# Patient Record
Sex: FEMALE | Race: OTHER | HISPANIC OR LATINO | Employment: PART TIME | URBAN - METROPOLITAN AREA
[De-identification: names, ages, dates, MRNs, and addresses within clinical notes are randomized per-mention and may not be internally consistent; named-entity substitution may affect disease eponyms.]

---

## 2019-07-22 ENCOUNTER — OFFICE VISIT (OUTPATIENT)
Dept: FAMILY MEDICINE CLINIC | Facility: CLINIC | Age: 59
End: 2019-07-22

## 2019-07-22 VITALS
WEIGHT: 172.4 LBS | TEMPERATURE: 97.7 F | DIASTOLIC BLOOD PRESSURE: 72 MMHG | BODY MASS INDEX: 28.72 KG/M2 | HEART RATE: 80 BPM | HEIGHT: 65 IN | RESPIRATION RATE: 16 BRPM | SYSTOLIC BLOOD PRESSURE: 116 MMHG

## 2019-07-22 DIAGNOSIS — M15.9 PRIMARY OSTEOARTHRITIS INVOLVING MULTIPLE JOINTS: ICD-10-CM

## 2019-07-22 DIAGNOSIS — K29.00 OTHER ACUTE GASTRITIS WITHOUT HEMORRHAGE: Primary | ICD-10-CM

## 2019-07-22 DIAGNOSIS — Z00.00 HEALTH CARE MAINTENANCE: ICD-10-CM

## 2019-07-22 PROBLEM — K29.50 CHRONIC GASTRITIS WITHOUT BLEEDING: Status: ACTIVE | Noted: 2019-07-22

## 2019-07-22 PROCEDURE — 99213 OFFICE O/P EST LOW 20 MIN: CPT | Performed by: FAMILY MEDICINE

## 2019-07-22 RX ORDER — IBUPROFEN 200 MG
TABLET ORAL EVERY 6 HOURS PRN
COMMUNITY
End: 2019-11-05

## 2019-07-22 RX ORDER — RANITIDINE 150 MG/1
150 TABLET ORAL 2 TIMES DAILY
COMMUNITY
End: 2019-10-04 | Stop reason: SDUPTHER

## 2019-07-22 NOTE — ASSESSMENT & PLAN NOTE
31-year-old, long-term use of tobacco  Reports regular follow-up in home country [place]  Currently moved to the [place]  Reports to have had mammogram, colonoscopy, Pap and low-dose CT done  Patient encouraged to provide health records  Scheduled adult annual visit for October ( patient will be traveling to [place] next month will return at the end of September)  Will review medical records and schedule health maintenance screening test accordingly  Patient agreeable with the plan

## 2019-07-22 NOTE — ASSESSMENT & PLAN NOTE
Reports osteoarthritis of the both knees, cervical spine and lower back  Patient reports chronic use of NSAIDs for symptom relief  Also on a chronic use of omeprazole for gastritis    - patient advised to use Tylenol for symptom relief in the setting of gastritis  - encourage use of Zantac instead omeprazole due to possible side effect of osteoporosis  - encourage strengthening exercises

## 2019-07-22 NOTE — PROGRESS NOTES
Assessment/Plan:    Chronic gastritis without bleeding  62year-old here to establish care  Reports chronic gastritis on chronic use of omeprazole and Zantac  Denies hematemesis, melena, hematochezia  Reports occasional abdominal pain (epigastric)  Physical exam benign  Patient reports chronic use of NSAIDs namely Aleve and Motrin for osteoarthritis    - advised to avoid NSAIDs in the setting of gastritis  - encouraged to quit smoking  - encourage use of Zantac for symptom relief    Osteoarthritis of multiple joints  Reports osteoarthritis of the both knees, cervical spine and lower back  Patient reports chronic use of NSAIDs for symptom relief  Also on a chronic use of omeprazole for gastritis    - patient advised to use Tylenol for symptom relief in the setting of gastritis  - encourage use of Zantac instead omeprazole due to possible side effect of osteoporosis  - encourage strengthening exercises      Health care maintenance  62year-old, long-term use of tobacco  Reports regular follow-up in home country [facility]  Currently moved to the [place]  Reports to have had mammogram, colonoscopy, Pap and low-dose CT done  Patient encouraged to provide health records  Scheduled adult annual visit for October ( patient will be traveling to [place] next month will return at the end of September)  Will review medical records and schedule health maintenance screening test accordingly  Patient agreeable with the plan           Subjective:      Patient ID: Little Dawkins is a 62 y o  female  HPI:  Pt reports hx Gastritis, osteoarthritis, Chronic low back pain on Chronic use of Alleve, Omeprazole and Zantac  Recently came to the country from [facility], where she reports to have had all her medical care  Reports right ear pain 2x within the past year  No discharge, occasional loss of balanace while walking, with  occasional hearing loss in Right ear  Also reports vertigo  Denies falls      On Montelucast and singular for allergy for 2 year  Recently started on a Gluten free diet which has provided some relief  Otherwise denies having celiac disease in childhood  Patient also reports Tobacco use 4-5 cigarretes/ day decreasedd from 10 initially, smoked since whe was 23 years  Denies Etoh or illicit drug use      For health maintenance, patient reports having had mammogram done a little over a year ago which was positive for fibroadenoma  She also reports her colonoscopy was about 9 years ago and normal   Patient reports to have had a low-dose chest CT in 2018 with normal results        Menopause: last period 2018    Pt advised to provide medical reports  She states she will be traveling to [facility] next month and will return with all her reports  The following portions of the patient's history were reviewed and updated as appropriate:   She  has no past medical history on file  She   Patient Active Problem List    Diagnosis Date Noted    Chronic gastritis without bleeding 2019    Osteoarthritis of multiple joints 2019    Health care maintenance 2019     She  has a past surgical history that includes  section  Her family history includes Prostate cancer in her father  She  reports that she has been smoking  She has been smoking about 0 50 packs per day  She has never used smokeless tobacco  She reports that she does not drink alcohol or use drugs  Current Outpatient Medications   Medication Sig Dispense Refill    ibuprofen (MOTRIN) 200 mg tablet Take by mouth every 6 (six) hours as needed for mild pain      ranitidine (ZANTAC) 150 mg tablet Take 150 mg by mouth 2 (two) times a day       No current facility-administered medications for this visit        Current Outpatient Medications on File Prior to Visit   Medication Sig    ibuprofen (MOTRIN) 200 mg tablet Take by mouth every 6 (six) hours as needed for mild pain    ranitidine (ZANTAC) 150 mg tablet Take 150 mg by mouth 2 (two) times a day     No current facility-administered medications on file prior to visit  She is allergic to penicillins       Review of Systems   Constitutional: Negative for appetite change and unexpected weight change  HENT: Positive for ear pain (Right)  Negative for congestion  Eyes: Negative for visual disturbance  Respiratory: Positive for cough  Cardiovascular: Negative for chest pain and palpitations  Gastrointestinal: Positive for abdominal pain  Endocrine: Negative for heat intolerance  Genitourinary: Negative for dysuria  Musculoskeletal: Positive for arthralgias  Skin: Negative for rash  Neurological: Negative for headaches  Psychiatric/Behavioral: Negative for agitation  Objective:      /72 (BP Location: Left arm, Patient Position: Sitting, Cuff Size: Standard)   Pulse 80   Temp 97 7 °F (36 5 °C) (Tympanic)   Resp 16   Ht 5' 5 3" (1 659 m)   Wt 78 2 kg (172 lb 6 4 oz)   BMI 28 43 kg/m²          Physical Exam   Constitutional: She appears well-developed and well-nourished  HENT:   Head: Normocephalic and atraumatic  Eyes: Pupils are equal, round, and reactive to light  Neck: Normal range of motion  Cardiovascular: Normal rate, regular rhythm, normal heart sounds and intact distal pulses  No murmur heard  Pulmonary/Chest: Effort normal and breath sounds normal    Abdominal: Soft  Bowel sounds are normal    Musculoskeletal: Normal range of motion  Neurological: She is alert  Normal gait   Skin: Skin is warm and dry  Capillary refill takes less than 2 seconds  Psychiatric: She has a normal mood and affect  Vitals reviewed

## 2019-07-22 NOTE — ASSESSMENT & PLAN NOTE
77-year-old here to establish care  Reports chronic gastritis on chronic use of omeprazole and Zantac  Denies hematemesis, melena, hematochezia    Reports occasional abdominal pain (epigastric)  Physical exam benign  Patient reports chronic use of NSAIDs namely Aleve and Motrin for osteoarthritis    - advised to avoid NSAIDs in the setting of gastritis  - encouraged to quit smoking  - encourage use of Zantac for symptom relief

## 2019-09-17 ENCOUNTER — OFFICE VISIT (OUTPATIENT)
Dept: FAMILY MEDICINE CLINIC | Facility: CLINIC | Age: 59
End: 2019-09-17

## 2019-09-17 VITALS
SYSTOLIC BLOOD PRESSURE: 96 MMHG | RESPIRATION RATE: 18 BRPM | WEIGHT: 172.2 LBS | HEART RATE: 100 BPM | DIASTOLIC BLOOD PRESSURE: 58 MMHG | TEMPERATURE: 99.1 F | BODY MASS INDEX: 28.69 KG/M2 | HEIGHT: 65 IN

## 2019-09-17 DIAGNOSIS — Z12.39 BREAST CANCER SCREENING: ICD-10-CM

## 2019-09-17 DIAGNOSIS — Z00.00 HEALTH CARE MAINTENANCE: ICD-10-CM

## 2019-09-17 DIAGNOSIS — R42 VERTIGO: Primary | ICD-10-CM

## 2019-09-17 PROCEDURE — 99213 OFFICE O/P EST LOW 20 MIN: CPT | Performed by: FAMILY MEDICINE

## 2019-09-17 PROCEDURE — 3008F BODY MASS INDEX DOCD: CPT | Performed by: FAMILY MEDICINE

## 2019-09-17 NOTE — PROGRESS NOTES
Toney Aquino 1960 female MRN: [id number]    Family Medicine Follow-up Visit    ASSESSMENT/PLAN  Problem List Items Addressed This Visit        Other    Breast cancer screening    Relevant Orders    Mammo diagnostic bilateral w cad    Vertigo - Primary     Vertigo likely due to BPPV versus orthostatic hypotension  Patient reports she has soft blood pressures at baseline  BP today  96/58,  Blood pressure from previous visit on 07/22 was 116/72  [place]-Hallpike positive today,  Patient could however not tolerate the rest of the exam due to dizziness and Epley's maneuver could not be completed  - will refer to Vestibular therapy  - Meclizine 25 mg daily  - F/u in 1 month or sooner if symptoms worsen or persists         Relevant Orders    Ambulatory referral to Physical Therapy              Future Appointments   Date Time Provider Viral Cazares   10/14/2019  1:20 PM Pedro Galaviz MD [facility] MELLY Nunez          SUBJECTIVE  CC: Earache (2 months) and Dizziness (due to ear pain)      HPI:  Toney Aquino is a 61 y o  female who presents for follow up  Pt reports she feels ear ache onset for about 1 month  Ear arche is associated with room spinning sensation and ringing in the ears  Pt reports dizziness is worse with changes in position, lasts about one minute and sometimes associated with nausea and vomiting  Pt also reports reduced hearing in the right ear  Pt denies any recent URI symptoms  Pt reports symptoms is associated with loss of balance while walking  Pt reports she fell last week at home, denies head injury and LOC  She denies hospital evaluation during that time  Of note pt reports she was diagnosed with having vertigo in the past 5 years that waxes and wanes      As discussed during last visit,  Patient presents with reports from [facility] concerning her Pap and mammogram   Last Pap was in October 2018 that was negative for HPV and negative for cancer  Patient reports her last colonoscopy was 10 years ago with normal results  Mammogram results shows BI-RADS 0 left breast in 2018, pt  Did not follow up for further testing as recommended      Review of Systems   Constitutional: Negative for appetite change, chills and fever  HENT: Positive for ear pain and tinnitus  Negative for congestion and ear discharge  Hearing loss: Decreased hearing in right ear  Eyes: Negative for visual disturbance  Respiratory: Negative for chest tightness and shortness of breath  Cardiovascular: Negative for chest pain and palpitations  Gastrointestinal: Negative for abdominal pain, nausea and vomiting  Genitourinary: Negative for dysuria  Neurological: Positive for dizziness, light-headedness and headaches  Historical Information   The patient history was reviewed as follows:    History reviewed  No pertinent past medical history    Past Surgical History:   Procedure Laterality Date     SECTION      3 times     Family History   Problem Relation Age of Onset    Prostate cancer Father       Social History   Social History     Substance and Sexual Activity   Alcohol Use Never    Frequency: Never     Social History     Substance and Sexual Activity   Drug Use Never     Social History     Tobacco Use   Smoking Status Current Every Day Smoker    Packs/day: 0 50   Smokeless Tobacco Never Used       Medications:     Current Outpatient Medications:     ibuprofen (MOTRIN) 200 mg tablet, Take by mouth every 6 (six) hours as needed for mild pain, Disp: , Rfl:     ranitidine (ZANTAC) 150 mg tablet, Take 150 mg by mouth 2 (two) times a day, Disp: , Rfl:   Allergies   Allergen Reactions    Penicillins Rash     Will get urine infections       OBJECTIVE    Vitals:   Vitals:    19 1827   BP: 96/58   BP Location: Left arm   Patient Position: Sitting   Cuff Size: Large   Pulse: 100   Resp: 18   Temp: 99 1 °F (37 3 °C)   TempSrc: Tympanic   Weight: 78 1 kg (172 lb 3 2 oz)   Height: 5' 5 2" (1 656 m)           Physical Exam   Constitutional: She appears well-developed and well-nourished  HENT:   Head: Normocephalic and atraumatic  Right Ear: External ear normal    Left Ear: External ear normal    Neck: Normal range of motion  Cardiovascular: Normal rate, regular rhythm and normal heart sounds  Pulmonary/Chest: Effort normal and breath sounds normal    Abdominal: Soft  Bowel sounds are normal  There is no tenderness  There is no rebound and no guarding  Musculoskeletal: Normal range of motion  Neurological: She is alert  [place]-Hallpike positive, pt could not tolerate rest of testing as well as Epley's   maneuver   Skin: Skin is warm and dry  Psychiatric: She has a normal mood and affect  Vitals reviewed                   Katrina Velazquez MD, PGY-2  [facility]   9/17/2019

## 2019-09-17 NOTE — ASSESSMENT & PLAN NOTE
Vertical likely due to BPPV versus orthostatic hypotension  Patient reports she has soft blood pressures  BP today  96/58,  Blood pressure from previous visit on 07/22 was 116/72  [place]-Hallpike positive today,  Patient could however not tolerate the rest of the exam due to dizziness and Epley's maneuver could not be completed      - will refer to Vestibular therapy  - Meclizine 25 mg daily  - F/u in 1 month or sooner if symptoms worsen or persists

## 2019-09-18 ENCOUNTER — TELEPHONE (OUTPATIENT)
Dept: FAMILY MEDICINE CLINIC | Facility: CLINIC | Age: 59
End: 2019-09-18

## 2019-09-18 NOTE — TELEPHONE ENCOUNTER
Patient tried scheduling mammogram, the diagnosis does not coordinate with test ordered  There has to be reason for diagnostic order, please review  Also, patient thought she was getting medication for her dizziness, there was nothing sent to pharmacy  Please review

## 2019-09-18 NOTE — TELEPHONE ENCOUNTER
Patient calling requesting the prescription for Meclizine 25 mg daily gets sent to [facility] per her visit on 09/17/19 with Dr Mikaela Lentz

## 2019-09-19 DIAGNOSIS — Z12.39 BREAST CANCER SCREENING: Primary | ICD-10-CM

## 2019-09-19 DIAGNOSIS — R42 VERTIGO: Primary | ICD-10-CM

## 2019-09-19 RX ORDER — MECLIZINE HYDROCHLORIDE 25 MG/1
25 TABLET ORAL DAILY
Qty: 30 TABLET | Refills: 0 | Status: SHIPPED | OUTPATIENT
Start: 2019-09-19 | End: 2019-09-19 | Stop reason: SDUPTHER

## 2019-09-19 RX ORDER — MECLIZINE HYDROCHLORIDE 25 MG/1
25 TABLET ORAL DAILY
Qty: 30 TABLET | Refills: 1 | Status: SHIPPED | OUTPATIENT
Start: 2019-09-19 | End: 2021-04-27 | Stop reason: ALTCHOICE

## 2019-09-23 LAB
ALBUMIN SERPL-MCNC: 4 G/DL (ref 3.6–5.1)
ALBUMIN/CREAT UR: 7 MCG/MG CREAT
ALBUMIN/GLOB SERPL: 1.5 (CALC) (ref 1–2.5)
ALP SERPL-CCNC: 93 U/L (ref 33–130)
ALT SERPL-CCNC: 19 U/L (ref 6–29)
AST SERPL-CCNC: 19 U/L (ref 10–35)
BILIRUB SERPL-MCNC: 0.6 MG/DL (ref 0.2–1.2)
BUN SERPL-MCNC: 14 MG/DL (ref 7–25)
BUN/CREAT SERPL: NORMAL (CALC) (ref 6–22)
CALCIUM SERPL-MCNC: 9.8 MG/DL (ref 8.6–10.4)
CHLORIDE SERPL-SCNC: 105 MMOL/L (ref 98–110)
CHOLEST SERPL-MCNC: 184 MG/DL
CHOLEST/HDLC SERPL: 4.2 (CALC)
CO2 SERPL-SCNC: 31 MMOL/L (ref 20–32)
CREAT SERPL-MCNC: 0.6 MG/DL (ref 0.5–1.05)
CREAT UR-MCNC: 70 MG/DL (ref 20–275)
GLOBULIN SER CALC-MCNC: 2.7 G/DL (CALC) (ref 1.9–3.7)
GLUCOSE SERPL-MCNC: 84 MG/DL (ref 65–99)
HBA1C MFR BLD: 5.4 % OF TOTAL HGB
HDLC SERPL-MCNC: 44 MG/DL
LDLC SERPL CALC-MCNC: 107 MG/DL (CALC)
MICROALBUMIN UR-MCNC: 0.5 MG/DL
NONHDLC SERPL-MCNC: 140 MG/DL (CALC)
POTASSIUM SERPL-SCNC: 4.4 MMOL/L (ref 3.5–5.3)
PROT SERPL-MCNC: 6.7 G/DL (ref 6.1–8.1)
SL AMB EGFR AFRICAN AMERICAN: 116 ML/MIN/1.73M2
SL AMB EGFR NON AFRICAN AMERICAN: 100 ML/MIN/1.73M2
SODIUM SERPL-SCNC: 140 MMOL/L (ref 135–146)
TRIGL SERPL-MCNC: 209 MG/DL

## 2019-09-25 ENCOUNTER — EVALUATION (OUTPATIENT)
Dept: PHYSICAL THERAPY | Facility: CLINIC | Age: 59
End: 2019-09-25
Payer: COMMERCIAL

## 2019-09-25 DIAGNOSIS — R42 VERTIGO: Primary | ICD-10-CM

## 2019-09-25 PROCEDURE — 97162 PT EVAL MOD COMPLEX 30 MIN: CPT | Performed by: PHYSICAL THERAPIST

## 2019-09-25 NOTE — PROGRESS NOTES
PT Evaluation     Today's date: 2019  Patient name: Carol James  : 1960  MRN: [id number]  Referring provider: Cassandra Duong MD  Dx:   Encounter Diagnosis     ICD-10-CM    1  Vertigo R42 Ambulatory referral to Physical Therapy                  Assessment  Assessment details: Pt presents with s/s consistent with R BPPV and cervicogenic dizziness secondary to postural abnormalities and upper cv ROM limitations  She will benefit from skilled PT services to address her impairments in order to resolve HA, vertigo, and gait imbalance  Impairments: abnormal muscle firing, abnormal or restricted ROM, abnormal movement, activity intolerance, impaired balance, impaired physical strength, lacks appropriate home exercise program, pain with function, safety issue and poor body mechanics  Understanding of Dx/Px/POC: good   Prognosis: good    Goals  STG - 2-4 visits  1) pt will be I with HEP  2) pt will demonstrate > 70 deg cv rot AROM  3) pt will improve vertigo > 90%    LTG - 4-8 visits  1) pt will demonstrate (-) [id number]  2) pt will demonstrate > 10 sec SLS EC  3) pt will resolve vertigo, HA, and functional limitations    Plan  Planned therapy interventions: joint mobilization, manual therapy, balance, neuromuscular re-education, patient education, postural training, body mechanics training, functional ROM exercises, gait training, home exercise program, strengthening, stretching, therapeutic activities and therapeutic exercise  Frequency: 2x week  Duration in weeks: 4  Treatment plan discussed with: patient        Subjective Evaluation    History of Present Illness  Mechanism of injury: Pt is a 61 y o  female with PMHx significant for chronic cv pain  She is primarily [place]-speaking and arrived with her SO who provided translation  She reports sudden onset of vertigo as well as N/V with episodes of emesis, HA, imbalance, and 1 fall 3 mos ago    She reports a 15-second duration of symptoms that occur with transfers, positional changes, and cv AROM  Pain  Current pain ratin  At best pain ratin  At worst pain rating: 10  Location: R>L temporal HA  Progression: no change      Diagnostic Tests  No diagnostic tests performed  Treatments  No previous or current treatments  Patient Goals  Patient goals for therapy: decreased pain  Patient goal: resolve dizziness        Objective     Concurrent Complaints  Positive for nausea/motion sickness and tinnitus (c/o R ear pain)  Active Range of Motion   Cervical/Thoracic Spine       Cervical    Flexion: 50 degrees   Extension: 35 degrees      Left lateral flexion: 35 degrees      Right lateral flexion: 53 degrees      Left rotation: 45 degrees  Right rotation: 45 degrees             Functional Assessment        Comments  Balance:  FT EO: > 30 sec  FT EC: 3 sec  Tandem EO: L: 17 sec, R: 3 sec  SLS: L: 10 sec, L: 5 sec    Neuro Exam:     Dizziness  Positive for disequilibrium, vertigo and rocking or swaying  Negative for oscillopsia, motion sickness, light-headedness, diplopia and floating or swimming  Exacerbating factors  Positive for bending over, rolling in bed, looking up, turning head and supine to/from sitting  Headaches   Intensity at best: 0/10  Intensity at worst: 10/10  Average intensity: 5/10  Location: B temporal    Cervical exam   Ligament Laxity Testing   Alar ligament: WNL  Sharp Gladis:  WNL  Modified VBI   Left: asymptomatic  Right: asymptomatic  Seated posture: forward head posture    Positional testing   [place]-Hallpike   Right posterior canal: symptomatic and torsional      Flowsheet Rows      Most Recent Value   PT/OT G-Codes   Current Score  42   Projected Score  68             Precautions: chronic cv pain  Dx: R BPPV, cervicogenic dizziness    Manual              R epley maneuver             Seated C2 extension SNAGs 1x3            Seated C2/3 B rot SNAGs             [place] suboccipitals                              Exercise Diary  9/25            4-finger cv rot AROM 2"/  1x10 ea            Seated cv retraction 2"x10            iso scap squeeze 5"x10            DNF             BioDex FT EC L=S  60"x1            BioDex tandem EO             BioDex SLS EO                                                                                                                                                                                          Modalities

## 2019-09-30 ENCOUNTER — TELEPHONE (OUTPATIENT)
Dept: FAMILY MEDICINE CLINIC | Facility: CLINIC | Age: 59
End: 2019-09-30

## 2019-09-30 DIAGNOSIS — R10.84 GENERALIZED ABDOMINAL PAIN: ICD-10-CM

## 2019-09-30 DIAGNOSIS — R19.7 DIARRHEA, UNSPECIFIED TYPE: Primary | ICD-10-CM

## 2019-09-30 NOTE — TELEPHONE ENCOUNTER
Please schedule appt  For this patient for [facility]  Patient has called twice and has been told that he needs insurance referral      Patient does not require an insurance referral for [facility] at this time because even if we are star [facility], our [facility] provider numbers are still the same       Please call patient to schedule

## 2019-09-30 NOTE — TELEPHONE ENCOUNTER
Can provider place new Physician Order for Ambulatory Referral for Gastroenterology  Patient called Dr Sherryle Griffins office, They asked her if she was symptomatic and she stated she has Abdominal discomfort and Diarrhea  They referred her to the [facility] but she can't be seen by colo-rectal because those are not Dx that they see  Gastroenterology would be the specialist that she has to see  Plese place referral for [address] [place] [address] Gastroenterology  Thanks!!!  I will then help patient schedule the appt

## 2019-09-30 NOTE — TELEPHONE ENCOUNTER
Due to patient's symptoms, our Dr Melanie Bales put in new referral to [address] [place] [address] Gastroenterology  No Appointment needed at [facility]

## 2019-09-30 NOTE — TELEPHONE ENCOUNTER
CALLED DR COHN'S OFFICE ([phone]) ADVISED WE NEED DR'S REFERRAL TO INDICATE A DIAGNOSIS -  WE WILL GET CALL BACK

## 2019-09-30 NOTE — TELEPHONE ENCOUNTER
Call from Ted Galvez at [facility] regarding order for Marta Santizo   She said ICD 10 code Z00 00 can't be used, they need diagnosis stating her problem that needs to be treated

## 2019-10-01 ENCOUNTER — HOSPITAL ENCOUNTER (OUTPATIENT)
Dept: RADIOLOGY | Facility: HOSPITAL | Age: 59
Discharge: HOME | End: 2019-10-01
Payer: COMMERCIAL

## 2019-10-01 VITALS — HEIGHT: 65 IN | BODY MASS INDEX: 28.66 KG/M2 | WEIGHT: 172 LBS

## 2019-10-01 DIAGNOSIS — Z12.39 BREAST CANCER SCREENING: ICD-10-CM

## 2019-10-01 PROCEDURE — 77067 SCR MAMMO BI INCL CAD: CPT

## 2019-10-01 NOTE — TELEPHONE ENCOUNTER
PATIENT DOES NOT WANT APPT  FOR GI, SHE STATES SHE HAS NO SYMPTOMS AND ONLY WANTS SCREENING FOR COLONOSCOPY BUT COLO-RECTAL WON'T SCHEDULE BECAUSE SHE SAID SHE WAS SYMPTOMATIC  APPT  WITH GI IS CANCELLED   PATIENT WILL SPEAK TO PCP ON 10/14/19

## 2019-10-04 ENCOUNTER — OFFICE VISIT (OUTPATIENT)
Dept: FAMILY MEDICINE CLINIC | Facility: CLINIC | Age: 59
End: 2019-10-04

## 2019-10-04 VITALS
BODY MASS INDEX: 28.79 KG/M2 | SYSTOLIC BLOOD PRESSURE: 142 MMHG | DIASTOLIC BLOOD PRESSURE: 100 MMHG | WEIGHT: 173 LBS | RESPIRATION RATE: 18 BRPM | HEART RATE: 96 BPM | TEMPERATURE: 98.9 F

## 2019-10-04 DIAGNOSIS — Z71.6 ENCOUNTER FOR SMOKING CESSATION COUNSELING: ICD-10-CM

## 2019-10-04 DIAGNOSIS — K29.50 OTHER CHRONIC GASTRITIS WITHOUT HEMORRHAGE: ICD-10-CM

## 2019-10-04 DIAGNOSIS — R42 VERTIGO: ICD-10-CM

## 2019-10-04 DIAGNOSIS — Z12.11 ENCOUNTER FOR SCREENING COLONOSCOPY: Primary | ICD-10-CM

## 2019-10-04 PROCEDURE — 99213 OFFICE O/P EST LOW 20 MIN: CPT | Performed by: FAMILY MEDICINE

## 2019-10-04 RX ORDER — RANITIDINE 150 MG/1
150 TABLET ORAL 2 TIMES DAILY
Qty: 90 TABLET | Refills: 2 | Status: SHIPPED | OUTPATIENT
Start: 2019-10-04 | End: 2019-11-18

## 2019-10-04 NOTE — PROGRESS NOTES
Lis Robles 1960 female MRN: [id number]    Family Medicine Follow-up Visit    ASSESSMENT/PLAN  Problem List Items Addressed This Visit        Digestive    Chronic gastritis without bleeding     Refill on Zantac provided         Relevant Medications    ranitidine (ZANTAC) 150 mg tablet       Other    Encounter for smoking cessation counseling - Primary     Tobacco Cessation Counseling: Tobacco cessation counseling and education was provided  The patient is sincerely urged to quit consumption of tobacco  She is ready to quit tobacco  The numerous health risks of tobacco consumption were discussed  Prescribed the following medications: nicotine patch  Relevant Medications    nicotine (NICODERM CQ) 7 mg/24hr TD 24 hr patch    Vertigo     Improving after one session of vestibular therapy  Denies dizziness, nausea, unstable gait, loss of balance or falls  Pt encouraged to continue with vestibular therapy as scheduled                       Future Appointments   Date Time Provider Viral Cazares   10/7/2019  3:15 PM Shreyas Jacobo, PT [facility] [address] [facility]   10/9/2019  3:15 PM Shreyas Jacobo, PT BE [facility]   10/14/2019  3:15 PM Shreyas Jacobo, PT [facility] [address] [facility]   10/16/2019  3:15 PM Dopb Jacobo, PT [facility] [address] [facility] [facility]   10/30/2019  2:00 PM Hernandez Dougherty MD [address] [place] [address]   1/6/2020  1:00 PM Zuleika Nesbitt MD  [facility]          SUBJECTIVE  CC: Dizziness (follow up)      HPI:  Lis Robles is a 61 y o  female who presents for follow up  Pt reports dizziness resolved after one session of PT(vestibular therapy)  Pt reports acid reflux lately, of note pt reports hx of GERD on PPI previously  Recently weaned off PPI and started on Zantac which she well tolerates  Denies vomiting, melena, hematochezia, fever  Recently evaluated by her dentist and started on Clindamycin after tooth extraction  Reports loose stools during Abx use      Pt is trying to stop smoking  Currently smokes about 5-6 cigarettes, been smoking for 15 years  BW and mammogram results reviewed with pt  Review of Systems   Constitutional: Negative for appetite change and fever  HENT: Negative for congestion  Eyes: Negative for visual disturbance  Respiratory: Negative for shortness of breath and wheezing  Cardiovascular: Negative for chest pain and palpitations  Gastrointestinal: Negative for abdominal pain, blood in stool, constipation, diarrhea and vomiting  Genitourinary: Negative for dysuria  Musculoskeletal: Negative for arthralgias  Skin: Negative for rash  Neurological: Negative for headaches  Historical Information   The patient history was reviewed as follows:    History reviewed  No pertinent past medical history    Past Surgical History:   Procedure Laterality Date     SECTION      3 times     Family History   Problem Relation Age of Onset    Prostate cancer Father [de-identified]    No Known Problems Mother     No Known Problems Sister     No Known Problems Daughter     No Known Problems Maternal Grandmother     No Known Problems Maternal Grandfather     No Known Problems Paternal Grandmother     No Known Problems Paternal Grandfather     No Known Problems Sister     No Known Problems Sister     No Known Problems Daughter     No Known Problems Daughter     No Known Problems Maternal Aunt     No Known Problems Maternal Aunt     No Known Problems Maternal Aunt     No Known Problems Maternal Aunt     No Known Problems Maternal Aunt     No Known Problems Paternal Aunt     No Known Problems Paternal Aunt       Social History   Social History     Substance and Sexual Activity   Alcohol Use Never    Frequency: Never     Social History     Substance and Sexual Activity   Drug Use Never     Social History     Tobacco Use   Smoking Status Current Every Day Smoker    Packs/day: 0 50   Smokeless Tobacco Never Used       Medications:     Current Outpatient Medications:     ibuprofen (MOTRIN) 200 mg tablet, Take by mouth every 6 (six) hours as needed for mild pain, Disp: , Rfl:     meclizine (ANTIVERT) 25 mg tablet, Take 1 tablet (25 mg total) by mouth daily, Disp: 30 tablet, Rfl: 1    nicotine (NICODERM CQ) 7 mg/24hr TD 24 hr patch, Place 1 patch on the skin every 24 hours, Disp: 28 patch, Rfl: 0    ranitidine (ZANTAC) 150 mg tablet, Take 1 tablet (150 mg total) by mouth 2 (two) times a day, Disp: 90 tablet, Rfl: 2  Allergies   Allergen Reactions    Penicillins Rash     Will get urine infections       OBJECTIVE    Vitals:   Vitals:    10/04/19 1257   BP: 142/100   BP Location: Left arm   Patient Position: Sitting   Cuff Size: Standard   Pulse: 96   Resp: 18   Temp: 98 9 °F (37 2 °C)   TempSrc: Tympanic   Weight: 78 5 kg (173 lb)           Physical Exam   Constitutional: She is oriented to person, place, and time  She appears well-developed and well-nourished  HENT:   Head: Normocephalic and atraumatic  Eyes: Conjunctivae are normal    Neck: Normal range of motion  Cardiovascular: Normal rate, regular rhythm and normal heart sounds  Pulmonary/Chest: Effort normal and breath sounds normal    Musculoskeletal: Normal range of motion  Neurological: She is alert and oriented to person, place, and time  Skin: Skin is warm and dry  Psychiatric: She has a normal mood and affect  Vitals reviewed                   Jannie Serrato MD, PGY-2  [facility]   10/4/2019

## 2019-10-04 NOTE — ASSESSMENT & PLAN NOTE
Tobacco Cessation Counseling: Tobacco cessation counseling and education was provided  The patient is sincerely urged to quit consumption of tobacco  She is ready to quit tobacco  The numerous health risks of tobacco consumption were discussed  Prescribed the following medications: nicotine patch

## 2019-10-04 NOTE — ASSESSMENT & PLAN NOTE
Improving after vestibular therapy  Denies dizziness, nausea, unstable gait, loss of balance or falls  Pt encouraged to continue with vestibular therapy as scheduled

## 2019-10-07 ENCOUNTER — OFFICE VISIT (OUTPATIENT)
Dept: PHYSICAL THERAPY | Facility: CLINIC | Age: 59
End: 2019-10-07
Payer: COMMERCIAL

## 2019-10-07 DIAGNOSIS — R42 VERTIGO: Primary | ICD-10-CM

## 2019-10-07 PROCEDURE — 97140 MANUAL THERAPY 1/> REGIONS: CPT | Performed by: PHYSICAL THERAPIST

## 2019-10-07 PROCEDURE — 97112 NEUROMUSCULAR REEDUCATION: CPT | Performed by: PHYSICAL THERAPIST

## 2019-10-07 NOTE — PROGRESS NOTES
Daily Note     Today's date: 10/7/2019  Patient name: Liza Riggins  : 1960  MRN: [id number]  Referring provider: Estephania Pena MD  Dx:   Encounter Diagnosis     ICD-10-CM    1  Vertigo R42                   Subjective: Pt reports no dizziness since IE  Objective: See treatment diary below  O: (-) [id number]    Assessment: Pt demonstrated mild dizziness with R ivey-daroff that resolved with repetition  Plan: Discharge NV if symptoms remain abolished         Precautions: chronic cv pain  Dx: R BPPV, cervicogenic dizziness    Manual  9/25 10/7           R epley maneuver x1 x1           Seated C2 extension SNAGs 1x3 np           Seated C2/3 B rot SNAGs  R/  2x10  L/  1x10           Graston suboccipitals  5 min                            Exercise Diary  9/25 10/7           4-finger cv rot AROM 2"/  1x10 ea 2"/  1x15 ea           Seated cv retraction 2"x10 3"x15           iso scap squeeze 5"x10 5"x15           DNF  5"x5           BioDex FT EC L=S  60"x1 L11  60"x1           BioDex tandem EO  L=S  60"x1           BioDex SLS EO  L=S  30"x1                                                                                                                                                                                        Modalities

## 2019-10-09 ENCOUNTER — OFFICE VISIT (OUTPATIENT)
Dept: PHYSICAL THERAPY | Facility: CLINIC | Age: 59
End: 2019-10-09
Payer: COMMERCIAL

## 2019-10-09 DIAGNOSIS — R42 VERTIGO: Primary | ICD-10-CM

## 2019-10-09 PROCEDURE — 97112 NEUROMUSCULAR REEDUCATION: CPT

## 2019-10-09 NOTE — PROGRESS NOTES
Daily Note     Today's date: 10/9/2019  Patient name: Juventino Ragsdale  : 1960  MRN: [id number]  Referring provider: Ame Calix MD  Dx:   Encounter Diagnosis     ICD-10-CM    1  Vertigo R42                   Subjective: Pt denies dizziness  Objective: See treatment diary below      Assessment: No complaints of dizzinesss w/ ivey-daroff today  She had mod sway and intermittent LOB with tandem EC  Instructed to continue to work on this at home with someone spotting her and with walls or countertops in reach for balance  Reviewed HEP with pt       Plan: Discharge       Precautions: chronic cv pain  Dx: R BPPV, cervicogenic dizziness    Manual  9/25 10/7 10/9          R epley maneuver x1 x1 1x          Seated C2 extension SNAGs 1x3 np np          Seated C2/3 B rot SNAGs  R/  2x10  L/  1x10 np          Graston suboccipitals  5 min 5 min                           Exercise Diary  9/25 10/7 10/9          4-finger cv rot AROM 2"/  1x10 ea 2"/  1x15 ea 2"  1x15          Seated cv retraction 2"x10 3"x15 3"x15          iso scap squeeze 5"x10 5"x15 5"x15          DNF  5"x5 5"x5          BioDex FT EC L=S  60"x1 L11  60"x1 L11 60"x1          BioDex tandem  L=S  60"x1 L=S 60"x1 ea EO, 30"x1 ea EC          BioDex SLS EO  L=S  30"x1 L=S 30"x1ea                                                                                                                                                                                       Modalities

## 2019-10-14 ENCOUNTER — APPOINTMENT (OUTPATIENT)
Dept: PHYSICAL THERAPY | Facility: CLINIC | Age: 59
End: 2019-10-14
Payer: COMMERCIAL

## 2019-10-16 ENCOUNTER — APPOINTMENT (OUTPATIENT)
Dept: PHYSICAL THERAPY | Facility: CLINIC | Age: 59
End: 2019-10-16
Payer: COMMERCIAL

## 2019-11-05 ENCOUNTER — OFFICE VISIT (OUTPATIENT)
Dept: FAMILY MEDICINE CLINIC | Facility: CLINIC | Age: 59
End: 2019-11-05

## 2019-11-05 VITALS
DIASTOLIC BLOOD PRESSURE: 80 MMHG | TEMPERATURE: 97.1 F | WEIGHT: 176.8 LBS | SYSTOLIC BLOOD PRESSURE: 108 MMHG | RESPIRATION RATE: 16 BRPM | BODY MASS INDEX: 29.42 KG/M2 | HEART RATE: 80 BPM

## 2019-11-05 DIAGNOSIS — M15.9 PRIMARY OSTEOARTHRITIS INVOLVING MULTIPLE JOINTS: Primary | ICD-10-CM

## 2019-11-05 PROCEDURE — 99213 OFFICE O/P EST LOW 20 MIN: CPT | Performed by: PHYSICIAN ASSISTANT

## 2019-11-05 RX ORDER — MELOXICAM 7.5 MG/1
7.5 TABLET ORAL 2 TIMES DAILY PRN
Qty: 60 TABLET | Refills: 0 | Status: SHIPPED | OUTPATIENT
Start: 2019-11-05 | End: 2020-01-22 | Stop reason: ALTCHOICE

## 2019-11-05 NOTE — PATIENT INSTRUCTIONS
Osteoartritis, cuidados ambulatorios   INFORMACIÓN GENERAL:   La osteoartritis  ocurre cuando el cartílago (tejido que amortigua paul articulación) se desgasta lentamente y causa que los huesos rocen entre ellos  La osteoartritis es paul condición que por lo general afecta las aj, jordan, lumbago, rodillas y caderas  La osteoartritis también se conoce roque artrosis o enfermedad degenerativa de las articulaciones  Los siguientes son los síntomas más comunes:   · Dolor en la articulación que empeora cuando usted mueve la articulación    Rigidez en la articulación que disminuye después que usted mueve la articulación      · Disminución del rango de movimiento     · Un crecimiento jeffery y huesudo de los dedos de las aj y pies    · Un patricia de raspado o crujido cuando usted mueve steven articulación  Busque atención médica inmediatamente al presentar los siguientes síntomas:   · Dolor intenso    · Incapaz de  steven articulación  El tratamiento para la osteoartritis  puede incluir cualquiera de los siguientes:  · El acetaminofén  se [place] para disminuir el dolor  Está disponible sin receta médica  Pregunte cuál es la cantidad que debe alistair, así roque la frecuencia  Siga indicaciones  El acetaminofén puede provocar daño al hígado al no tomarlo correctamente  · Los antiiflamatorios no esteroideos (AINEs) ayudan a reducir inflamación y dolor o [place]  [place]arona medicamento esta disponible con o sin paul receta médica  Los AINEs podrían causar sangrado estomacal o problemas en los riñones en [employer]  Si usted esta tomando un anticoágulante, siempre  pregunte a steven proveedor de juanjo si los AINEs son seguros para usted  Antes de [employer], rony siempre la etiqueta de información y siga rajinder indicaciones  · La crema de capsaicina  puede disminuir el dolor de rajinder articulaciones      · El medicamento para el dolor con receta  puede ser administrado para disminuir el dolor intenso si otros medicamentos no funcionan  [place] indicaciones  No espere hasta que el dolor sea intenso para entonces alistair el medicamento  · Paul inyección de esteroides  puede ser administrada si rajinder síntomas empeoran  · La fisioterapia  puede ser Rohini Freiberg por steven proveedor de [place]  Un fisioterapeuta le enseña los ejercicios para mejorar el rango de movimiento y la fortaleza con el fin de disminuir el dolor en las articulaciones  · Sherald Curet cirugía  puede ser necesaria si otros tratamientos no funcionan  Controle steven osteoartritis   · Manténgase activo  La actividad física puede reducir el dolor y mejorar steven habilidad de hacer rajinder actividades diarias  Evite actividades que causan dolor  Pregúntele a steven proveedor de juanjo qué tipos de ejercicios son los adecuados para usted  · Mantenga un peso saludable  [place] ayuda a disminuir la presión en las articulaciones en steven espalda, caderas, rodillas, tobillos y pies  Pregúntele a steven proveedor de juanjo cuál debería ser [employer]  [place] que le ayude a crear un plan para perder peso si usted tiene sobrepeso  · Aplique calor o hielo  en las articulaciones según indicaciones  El calor o el hielo pueden ayudar a disminuir el dolor, la inflamación y los espasmos musculares  Use paul almohadilla eléctrica en temperatura baja o tome paul ducha tibia  Use paul compresa de hielo o coloque hielo kirsty en paul bolsa plástica y cúbrala con paul toalla  · Dé un masaje  a los músculos alrededor de la articulación para aliviar el dolor y la rigidez  · Use un bastón, muletas o un caminador  para proteger y aliviar la presión en steven Ermelinda Puff y articulaciones de la cadera  También le pueden ordenar plantillas ortopédicas para rajinder zapatos para disminuir la presión de rajinder articulaciones  · Use zapatos sin tacones o de tacones bajos  [place] ayudará a disminuir el dolor y reducirá la presión en steven Ermelinda Puff y articulaciones de rajinder [id number] [facility]    Programe paul vane con steven proveedor de juanjo roque se le haya indicado: Anote rajinder preguntas para que se acuerde de hacerlas jenn rajinder visitas  ACUERDOS SOBRE COLORADO CUIDADO:   Usted tiene el derecho de participar en la planificación de colorado cuidado  Aprenda todo lo que pueda sobre colorado condición y roque darle tratamiento  Discuta con rajinder médicos rajinder opciones de tratamiento para juntos decidir el cuidado que usted quiere recibir  Usted siempre tiene el derecho a rechazar colorado tratamiento  Esta información es sólo para uso en educación  [place] intención no es darle un consejo médico sobre enfermedades o tratamientos  Colsulte con colorado Ronalee Mon farmacéutico antes de seguir cualquier régimen médico para saber si es seguro y efectivo para usted  © 2014 [address] is for End User's use only and may not be sold, redistributed or otherwise used for commercial purposes  All illustrations and images included in CareNotes® are the copyrighted property of A D A M , Inc  or Wilmer Wilde

## 2019-11-05 NOTE — ASSESSMENT & PLAN NOTE
Advised to continue Tylenol and OTC creams prn  Ice/heat  Mobic 7 5mg bid prn with food  Discussed d/t h/o gastritis must use only for moderate- severe pain and not to be taken daily  Patient verbalized understanding and agreed    Advised must take with Zantac

## 2019-11-05 NOTE — PROGRESS NOTES
Assessment/Plan:    Osteoarthritis of multiple joints  Advised to continue Tylenol and OTC creams prn  Ice/heat  Mobic 7 5mg bid prn with food  Discussed d/t h/o gastritis must use only for moderate- severe pain and not to be taken daily  Patient verbalized understanding and agreed  Advised must take with Zantac       Diagnoses and all orders for this visit:    Primary osteoarthritis involving multiple joints  -     meloxicam (MOBIC) 7 5 mg tablet; Take 1 tablet (7 5 mg total) by mouth 2 (two) times a day as needed for mild pain or moderate pain        Subjective:      Patient ID: Jayden Simms is a 61 y o  female  HPI     Here today for c/o chronic arthritic pain  Having increase pain in her knees, wrist, and hands lately  Pain is worst while working and after work  Describes as a dull ache  Uses OTC arthritic creams and Tylenol with minimal relief  Is requesting "something strong for pain and not Tylenol"  Works as a  full-time  The following portions of the patient's history were reviewed and updated as appropriate: allergies, current medications, past family history, past medical history, past social history, past surgical history and problem list     Review of Systems   Constitutional: Negative for chills, fatigue and fever  Respiratory: Negative for cough, chest tightness, shortness of breath and wheezing  Cardiovascular: Negative for chest pain and palpitations  Gastrointestinal: Negative for abdominal pain, constipation, diarrhea, nausea and vomiting  Genitourinary: Negative for difficulty urinating  Musculoskeletal: Positive for arthralgias  Negative for myalgias, neck pain and neck stiffness  Skin: Negative for rash and wound  Neurological: Negative for dizziness, weakness, light-headedness, numbness and headaches  Psychiatric/Behavioral: Negative for agitation and behavioral problems  The patient is not nervous/anxious            Objective:      /80 (BP Location: Left arm, Patient Position: Sitting, Cuff Size: Large)   Pulse 80   Temp (!) 97 1 °F (36 2 °C) (Tympanic)   Resp 16   Wt 80 2 kg (176 lb 12 8 oz)   BMI 29 42 kg/m²          Physical Exam   Constitutional: She is oriented to person, place, and time  She appears well-developed and well-nourished  No distress  HENT:   Head: Normocephalic and atraumatic  Neck: Normal range of motion  Neck supple  Cardiovascular: Normal rate, regular rhythm and normal heart sounds  No murmur heard  Pulmonary/Chest: Effort normal and breath sounds normal  No respiratory distress  She has no wheezes  Musculoskeletal: She exhibits no edema or deformity  Neurological: She is alert and oriented to person, place, and time  Psychiatric: She has a normal mood and affect   Her behavior is normal  Thought content normal

## 2019-11-18 ENCOUNTER — OFFICE VISIT (OUTPATIENT)
Dept: FAMILY MEDICINE CLINIC | Facility: CLINIC | Age: 59
End: 2019-11-18

## 2019-11-18 ENCOUNTER — TELEPHONE (OUTPATIENT)
Dept: FAMILY MEDICINE CLINIC | Facility: CLINIC | Age: 59
End: 2019-11-18

## 2019-11-18 VITALS
SYSTOLIC BLOOD PRESSURE: 106 MMHG | WEIGHT: 179.8 LBS | RESPIRATION RATE: 16 BRPM | BODY MASS INDEX: 29.92 KG/M2 | DIASTOLIC BLOOD PRESSURE: 74 MMHG | HEART RATE: 68 BPM | TEMPERATURE: 97.7 F

## 2019-11-18 DIAGNOSIS — M25.40 PAINFUL SWELLING OF JOINT: Primary | ICD-10-CM

## 2019-11-18 PROCEDURE — 4004F PT TOBACCO SCREEN RCVD TLK: CPT | Performed by: FAMILY MEDICINE

## 2019-11-18 PROCEDURE — 99213 OFFICE O/P EST LOW 20 MIN: CPT | Performed by: FAMILY MEDICINE

## 2019-11-18 RX ORDER — NAPROXEN 500 MG/1
500 TABLET ORAL 2 TIMES DAILY WITH MEALS
Qty: 10 TABLET | Refills: 0 | Status: SHIPPED | OUTPATIENT
Start: 2019-11-18 | End: 2019-12-03 | Stop reason: SDUPTHER

## 2019-11-18 RX ORDER — FAMOTIDINE 20 MG/1
20 TABLET, FILM COATED ORAL 2 TIMES DAILY
Qty: 90 TABLET | Refills: 2 | Status: SHIPPED | OUTPATIENT
Start: 2019-11-18 | End: 2020-03-12 | Stop reason: SDUPTHER

## 2019-11-18 NOTE — PROGRESS NOTES
Assessment/Plan:  Painful swelling of joint  - Start naproxen 500mg BID for 5 days  - Educated patient on risk of ulcers, take pepcid 20mg BID with naproxen  - Educated patient on warning signs of ulcer (epigastric pain, blood in stool, dark stools) and told her to call if she experiences any of these symptoms  - ordered RA workup (RF, JAQUELINE, anti-CCP, ESR)  - will f/u in 1 month or sooner if symptoms persist or worsen  - if symptoms persist with no relief on Naproxen, will consider short course of steroids to reduce inflammation    Subjective:    Patient ID: Josseline Patel is a 61 y o  female  Patient is a 60 yo female with PMH of vertigo, osteoarthritis, and gastritis who presented to the office complaining of continued joint pain in her hands  She reports this pain started about a week after starting her new job as a  on 10/21/19  She reports the pain is mostly in her wrist and MCP joints and is worse on the right  She also reports these joints to be swollen  Additionally she reports pain in her hips, knees, and elbows, but overall the pain in her hands is the worst  The pain is worst at night and worse with colder weather  She sometimes has morning joint stiffness notably in both hands  She was prescribed mobic for these pains earlier this month, but has had no relief  She also has tried some OTC creams that have not helped  She has never been worked up for RA/anutoimmune disease    Review of Systems   Constitutional: Negative for chills, fever and unexpected weight change  Respiratory: Negative for chest tightness and shortness of breath  Cardiovascular: Negative for chest pain and leg swelling  Gastrointestinal: Negative for abdominal pain, blood in stool, constipation and diarrhea  Genitourinary: Negative for difficulty urinating, dysuria and hematuria  Musculoskeletal: Positive for arthralgias and joint swelling  Neurological: Negative for weakness, numbness and headaches  Objective:  /74 (BP Location: Right arm, Patient Position: Sitting, Cuff Size: Large)   Pulse 68   Temp 97 7 °F (36 5 °C) (Tympanic)   Resp 16   Wt 81 6 kg (179 lb 12 8 oz)   BMI 29 92 kg/m²      Physical Exam   Constitutional: She appears well-developed and well-nourished  No distress  HENT:   Head: Normocephalic and atraumatic  Eyes: Conjunctivae and EOM are normal    Neck: Normal range of motion  Neck supple  Cardiovascular: Normal rate, regular rhythm and normal heart sounds  No murmur heard  Pulmonary/Chest: Effort normal and breath sounds normal  No respiratory distress  She has no wheezes  Abdominal: Soft  Bowel sounds are normal  She exhibits no distension  There is no tenderness  Musculoskeletal: She exhibits tenderness (right wrist and MCPs digits 2-5)  Mild swelling of right wrist and MCPs  Pain with passive extension of right wrist  Decreased ROM with flexion and extension of bilateral wrists   Skin: Skin is warm and dry  Psychiatric: She has a normal mood and affect

## 2019-11-18 NOTE — ASSESSMENT & PLAN NOTE
- Start naproxen 500mg BID for 5 days  - Educated patient on risk of ulcers, take pepcid 20mg BID with naproxen  - Educated patient on warning signs of ulcer (epigastric pain, blood in stool, dark stools) and told to the ED if she experiences any of these    - ordered RA workup (RF, JAQUELINE, anti-CCP, ESR)  - will f/u in 1 month or sooner if symptoms persist or worsen  - if symptoms persist with no relief on Naproxen, will consider short course of steroids to reduce inflammation

## 2019-11-18 NOTE — PATIENT INSTRUCTIONS
Articulación inflamada, cuidados ambulatorios   INFORMACIÓN GENERAL:   Paul articulación inflamada  puede ocurrir en paul o más articulaciones  También es probable que usted tenga otros síntomas, roque dolor, sensibilidad o rigidez  La inflamación de [place]ish [place] articulación puede ser el resultado de paul variedad de condiciones roque la artritis, seudogota, gota, tendinitis o lesiones  Busque cuidados inmediatos para los siguientes síntomas:   · Usted no puede  steven articulación del todo  · Usted tiene dolor severo que no mejora con medicamento  El tratamiento para paul articulación inflamada  depende de la causa de steven articulación inflamada  Steven proveedor de juanjo puede llegar a recomendarle cualquiera de los siguientes:  · Descanse  steven articulación inflamada  Evite actividades que empeoran la inflamación o el dolor  Es probable que usted necesite evitar apoyar steven peso sobre steven articulación cuando le duela  Se puede usar muletas o paul andadera para evitar apoyar el peso sobre articulaciones en la parte inferior de steven cuerpo  · Aplique hielo  sobre steven articulación inflamada por 15 a 20 minutos cada hora o según indicaciones  Use paul compresa fría o ponga hielo triturado en paul bolsa de plástico  Zoraida Gala la bolsa con paul toalla  El hielo ayuda a prevenir el daño a los tejidos y disminuye la inflamación y el dolor  · Aplique calor  sobre steven articulación inflamada por 20 a 30 minutos cada 2 horas por tantos días roque le indiquen  El calor ayuda a disminuir el dolor  · Eleve  steven articulación inflamada de manera que quede por encima del nivel de steven corazón, hágalo con tanta frecuencia roque sea posible  [place] ayudará a disminuir la inflamación y el dolor  [place] steven articulación sobre almohadas o cobijas dobladas para mantenerla elevada y cómoda  · Los antiiflamatorios no esteroideos (AINEs) ayudan a reducir inflamación y dolor o [place]  [place] medicamento esta disponible con o sin paul receta médica   [address] podrían causar sangrado estomacal o problemas en los riñones en ciertas personas  Si usted esta tomando un anticoágulante, siempre  pregunte a colorado proveedor de juanjo si los AINEs son seguros para usted  Antes de [employer], rony siempre la etiqueta de información y siga rajinder indicaciones  Programe paul vane con colorado proveedor de [employer] se le haya indicado: Anote rajinder preguntas para que se acuerde de hacerlas jenn rajinder visitas  ACUERDOS SOBRE COLORADO CUIDADO:   Usted tiene el derecho de participar en la planificación de colorado cuidado  Aprenda todo lo que pueda sobre colorado condición y roque darle tratamiento  Discuta con rajinder médicos rajinder opciones de tratamiento para juntos decidir el cuidado que usted quiere recibir  Usted siempre tiene el derecho a rechazar colorado tratamiento  Esta información es sólo para uso en educación  [place] intención no es darle un consejo médico sobre enfermedades o tratamientos  Colsulte con colorado Jody Seats farmacéutico antes de seguir cualquier régimen médico para saber si es seguro y efectivo para usted  © 2014 [address] is for End User's use only and may not be sold, redistributed or otherwise used for commercial purposes  All illustrations and images included in CareNotes® are the copyrighted property of A D A M , Inc  or Wilmer Wilde

## 2019-11-26 LAB
25(OH)D3 SERPL-MCNC: 37 NG/ML (ref 30–100)
ANA SER QL IF: NEGATIVE
CCP IGG SERPL-ACNC: <16 UNITS
ERYTHROCYTE [SEDIMENTATION RATE] IN BLOOD BY WESTERGREN METHOD: 22 MM/H
RHEUMATOID FACT SERPL-ACNC: <14 IU/ML

## 2019-12-03 ENCOUNTER — TELEPHONE (OUTPATIENT)
Dept: FAMILY MEDICINE CLINIC | Facility: CLINIC | Age: 59
End: 2019-12-03

## 2019-12-03 DIAGNOSIS — M25.40 PAINFUL SWELLING OF JOINT: ICD-10-CM

## 2019-12-03 RX ORDER — NAPROXEN 500 MG/1
500 TABLET ORAL 2 TIMES DAILY WITH MEALS
Qty: 10 TABLET | Refills: 0 | Status: SHIPPED | OUTPATIENT
Start: 2019-12-03 | End: 2020-01-22 | Stop reason: SDUPTHER

## 2019-12-03 NOTE — TELEPHONE ENCOUNTER
Placed call to pt and we discussed her negative work up for autoimmune disease  Pt however reports pain in the joints specifically in hands and hip  She reports improvement with Naproxen  Will prescribe Naproxen 500 mg bid for another 5 days and pt advised to follow up if symptoms persists or worsen  SE of Naproxen discussed and pt advised to take medication with Pepcid  She reports understanding

## 2019-12-03 NOTE — TELEPHONE ENCOUNTER
Patient called to speak with Dr Gurvinder Thompson about lab results and to let her know she is still having pain   She needs Indonesian

## 2020-01-22 ENCOUNTER — OFFICE VISIT (OUTPATIENT)
Dept: FAMILY MEDICINE CLINIC | Facility: CLINIC | Age: 60
End: 2020-01-22

## 2020-01-22 VITALS
HEART RATE: 74 BPM | TEMPERATURE: 98.2 F | RESPIRATION RATE: 16 BRPM | WEIGHT: 178 LBS | DIASTOLIC BLOOD PRESSURE: 72 MMHG | SYSTOLIC BLOOD PRESSURE: 102 MMHG | BODY MASS INDEX: 29.62 KG/M2

## 2020-01-22 DIAGNOSIS — Z11.59 NEED FOR HEPATITIS C SCREENING TEST: ICD-10-CM

## 2020-01-22 DIAGNOSIS — M54.41 ACUTE RIGHT-SIDED LOW BACK PAIN WITH RIGHT-SIDED SCIATICA: Primary | ICD-10-CM

## 2020-01-22 DIAGNOSIS — M25.40 PAINFUL SWELLING OF JOINT: ICD-10-CM

## 2020-01-22 DIAGNOSIS — R07.89 CHEST PAIN, ATYPICAL: ICD-10-CM

## 2020-01-22 DIAGNOSIS — M54.40 ACUTE RIGHT-SIDED LOW BACK PAIN WITH SCIATICA, SCIATICA LATERALITY UNSPECIFIED: ICD-10-CM

## 2020-01-22 DIAGNOSIS — Z11.4 ENCOUNTER FOR SCREENING FOR HIV: ICD-10-CM

## 2020-01-22 DIAGNOSIS — M54.40 ACUTE RIGHT-SIDED LOW BACK PAIN WITH SCIATICA, SCIATICA LATERALITY UNSPECIFIED: Primary | ICD-10-CM

## 2020-01-22 PROCEDURE — 4004F PT TOBACCO SCREEN RCVD TLK: CPT | Performed by: FAMILY MEDICINE

## 2020-01-22 PROCEDURE — 99213 OFFICE O/P EST LOW 20 MIN: CPT | Performed by: FAMILY MEDICINE

## 2020-01-22 RX ORDER — TROLAMINE SALICYLATE 10 G/100G
CREAM TOPICAL AS NEEDED
Qty: 85 G | Refills: 0 | Status: SHIPPED | OUTPATIENT
Start: 2020-01-22 | End: 2020-08-20 | Stop reason: CLARIF

## 2020-01-22 RX ORDER — NAPROXEN 500 MG/1
500 TABLET ORAL 2 TIMES DAILY WITH MEALS
Qty: 10 TABLET | Refills: 0 | Status: SHIPPED | OUTPATIENT
Start: 2020-01-22 | End: 2020-08-20 | Stop reason: SDUPTHER
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
## 2020-01-22 NOTE — PATIENT INSTRUCTIONS
Dolor de espalda   LO QUE NECESITA SABER:   El dolor de espalda es común  Puede ser causado por paul gran cantidad de afecciones, roque la artritis o por el deterioro de los discos de la columna vertebral  El riesgo del dolor de espalda aumenta al sufrir lesiones, por falta de [place], o inclinarse hacia adelante o girar de un lado a otro de forma repetitiva  Usted puede estar adolorido o sentir rigidez en le o ambos lados de la espalda  El dolor se puede propagar a rajinder glúteos o muslos  INSTRUCCIONES SOBRE EL [facility] HOSPITALARIA:   Medicamentos:   · AINEs (Analgésicos antiinflamatorios no esteroides)  ayudan a disminuir la inflamación y el dolor  Mojgan medicamento esta disponible con o sin paul receta médica  Los AINEs pueden causar sangrado estomacal o problemas renales en ciertas personas  Si usted raina un medicamento anticoagulante, siempre pregúntele a steven médico si los CARLEY son seguros para usted  Siempre rony la etiqueta de mojgan medicamento y [place] instrucciones  · El acetaminofén  [employer]  Está disponible sin receta médica  Pregunte la cantidad y la frecuencia con que debe tomarlos  [address]  El acetaminofén puede causar daño en el hígado cuando no se raina de forma correcta  · Un medicamento con receta para el dolor  podrían ser Chao Camilo  Pregunte al médico cómo debe alistair mojgan medicamento de forma hobson  · Shopiere rajinder medicamentos roque se le haya indicado  Consulte con steven médico si usted suhas que steven medicamento no le está ayudando o si presenta efectos secundarios  Infórmele si es alérgico a cualquier medicamento  Mantenga paul lista actualizada de los [place], las vitaminas y los productos herbales que raina  Incluya los siguientes datos de los medicamentos: cantidad, frecuencia y motivo de administración  Traiga con usted la lista o los envases de la píldoras a rajinder citas de seguimiento   Lleve la lista de los medicamentos con usted en elizabeth de Dolores emergencia  Acuda en 2 semanas a steven vane de control con steven médico, o según le indicaron:  Anote rajinder preguntas para que se acuerde de hacerlas jenn rajinder visitas  La forma de controlar steven dolor de espalda:   · Aplique hielo  en steven espalda o en el área afectada de 15 a 20 minutos cada hora o según le indicaron  Use un paquete de hielo o ponga hielo molido dentro de [employer]  Cúbrala con paul toalla  El hielo disminuye el dolor y ayuda a evitar el daño en los tejidos  · La aplicación de calor  en steven espalda o en el área afectada de 20 a 30 minutos cada 2 horas jenn los días que le indicaron  El calor ayuda a disminuir el dolor y los espasmos musculares  · Manténgase activo  lo más que pueda sin causar [employer]  El reposo en cama puede empeorar steven dolor de espalda  Evite levantar objetos hasta que ya no tenga dolor  Regrese a la darci de emergencias si:   · Usted tiene dolor, entumecimiento o debilidad en paul o en ambas piernas  · El dolor se vuelve tan intenso que lo incapacita para caminar  · Usted no puede controlar steven orina ni rajinder deposiciones intestinales  · Usted tiene dolor de espalda intenso con dolor en el pecho  · Usted tiene dolor de espalda intenso, náuseas y vómito  · Usted tiene un dolor de espalda intenso que se propaga a un costado o al área genital   [place] a steven Devante Throckmorton vitaminas y minerales son adecuados para usted  · Usted tiene dolor de espalda que no mejora con el reposo, ni con el medicamento para el dolor  · Usted tiene fiebre  · Usted tiene un dolor que empeora cuando está acostado b[place] Notice o al descansar  · Usted tiene dolor que empeora cuando tose o estornuda  · Usted pierde peso sin proponérselo  · Usted tiene preguntas o inquietudes acerca de steven condición o cuidado  © 2017 [address] Information is for End User's use only and may not be sold, redistributed or otherwise used for commercial purposes   All illustrations and images included in CareNotes® are the copyrighted property of A D A M , Inc  or Wilmer Wilde  Esta información es sólo para uso en educación  Steven intención no es darle un consejo médico sobre enfermedades o tratamientos  Colsulte con steven Abel Hu farmacéutico antes de seguir cualquier régimen médico para saber si es seguro y efectivo para usted

## 2020-01-22 NOTE — ASSESSMENT & PLAN NOTE
Acute on Chronic  Pain/ Discomfort with right lateral flexion  No sensory deficits  No red flags noted (new onset bowel or bladder incontinence, urinary retention, loss of anal sphincter tone, saddle anesthesia, h/o of metastatic cancer, or suspected spinal infection)   Likely MSK in nature  Patient educated ans reassurance provided    Explained that the prognosis is often good and self limiting   Advised to stay active and avoid bed rest which may seem counterintuitive  Initiate trial of NSAIDs and Voltaren gel   Pt declined referral for PT   Imaging not warranted now, may consider if pain not improved on follow up

## 2020-01-23 DIAGNOSIS — M54.40 ACUTE RIGHT-SIDED LOW BACK PAIN WITH SCIATICA, SCIATICA LATERALITY UNSPECIFIED: ICD-10-CM

## 2020-01-23 LAB
HCV AB S/CO SERPL IA: 0.01
HCV AB SERPL QL IA: NORMAL
HIV 1+2 AB+HIV1 P24 AG SERPL QL IA: NORMAL

## 2020-02-12 ENCOUNTER — OFFICE VISIT (OUTPATIENT)
Dept: LAB | Facility: HOSPITAL | Age: 60
End: 2020-02-12
Payer: COMMERCIAL

## 2020-02-12 DIAGNOSIS — R07.89 CHEST PAIN, ATYPICAL: ICD-10-CM

## 2020-02-12 PROCEDURE — 93005 ELECTROCARDIOGRAM TRACING: CPT

## 2020-02-13 LAB
ATRIAL RATE: 67 BPM
P AXIS: 71 DEGREES
PR INTERVAL: 168 MS
QRS AXIS: 47 DEGREES
QRSD INTERVAL: 84 MS
QT INTERVAL: 402 MS
QTC INTERVAL: 424 MS
T WAVE AXIS: 49 DEGREES
VENTRICULAR RATE: 67 BPM

## 2020-02-13 PROCEDURE — 93010 ELECTROCARDIOGRAM REPORT: CPT | Performed by: INTERNAL MEDICINE

## 2020-02-24 ENCOUNTER — OFFICE VISIT (OUTPATIENT)
Dept: FAMILY MEDICINE CLINIC | Facility: CLINIC | Age: 60
End: 2020-02-24

## 2020-02-24 VITALS
SYSTOLIC BLOOD PRESSURE: 140 MMHG | HEART RATE: 62 BPM | RESPIRATION RATE: 16 BRPM | HEIGHT: 66 IN | DIASTOLIC BLOOD PRESSURE: 90 MMHG | WEIGHT: 180 LBS | BODY MASS INDEX: 28.93 KG/M2 | TEMPERATURE: 98.2 F

## 2020-02-24 DIAGNOSIS — Z12.2 ENCOUNTER FOR SCREENING FOR LUNG CANCER: ICD-10-CM

## 2020-02-24 DIAGNOSIS — Z12.11 COLON CANCER SCREENING: ICD-10-CM

## 2020-02-24 DIAGNOSIS — R82.90 FOUL SMELLING URINE: Primary | ICD-10-CM

## 2020-02-24 DIAGNOSIS — F17.200 TOBACCO DEPENDENCE: ICD-10-CM

## 2020-02-24 DIAGNOSIS — Z00.00 ANNUAL PHYSICAL EXAM: Primary | ICD-10-CM

## 2020-02-24 PROCEDURE — 3008F BODY MASS INDEX DOCD: CPT | Performed by: FAMILY MEDICINE

## 2020-02-24 PROCEDURE — 99396 PREV VISIT EST AGE 40-64: CPT | Performed by: FAMILY MEDICINE

## 2020-02-24 NOTE — PROGRESS NOTES
[facility]    NAME: Brennan Garvin  AGE: 61 y o  SEX: female  : 1960     DATE: 2020     Assessment and Plan:     Problem List Items Addressed This Visit        Other    Encounter for screening for lung cancer     Relevant Orders    CT lung screening program    Tobacco dependence    Relevant Orders    CT lung screening program          Immunizations and preventive care screenings were discussed with patient today  Appropriate education was printed on patient's after visit summary  Counseling:  Alcohol/drug use: discussed moderation in alcohol intake, the recommendations for healthy alcohol use, and avoidance of illicit drug use  Dental Health: discussed importance of regular tooth brushing, flossing, and dental visits  Injury prevention: discussed safety/seat belts, safety helmets, smoke detectors, carbon dioxide detectors, and smoking near bedding or upholstery  · Exercise: the importance of regular exercise/physical activity was discussed  Recommend exercise 3-5 times per week for at least 30 minutes  BMI Counseling: Body mass index is 29 05 kg/m²  The BMI is above normal  Nutrition recommendations include reducing portion sizes, 3-5 servings of fruits/vegetables daily, reducing fast food intake, consuming healthier snacks, moderation in carbohydrate intake and reducing intake of saturated fat and trans fat  Exercise recommendations include vigorous aerobic physical activity for 75 minutes/week, joining a gym and strength training exercises  Return in 1 year (on 2021)  Chief Complaint:     Chief Complaint   Patient presents with    Annual Exam      History of Present Illness:     Adult Annual Physical   Patient here for a comprehensive physical exam  The patient reports Back pain  Diet and Physical Activity  · Diet/Nutrition: "regular" diet  · Exercise: no formal exercise  Depression Screening  PHQ-9 Depression Screening    PHQ-9:    Frequency of the following problems over the past two weeks:       Little interest or pleasure in doing things:  1 - several days  Feeling down, depressed, or hopeless:  1 - several days  PHQ-2 Score:  2       General Health  · Sleep: gets 7-8 hours of sleep on average  · Hearing: normal - bilateral   · Vision: goes for regular eye exams  · Dental: regular dental visits  /GYN Health  · Patient is: postmenopausal, menopause last year  · Last menstrual period: last year  · Contraceptive method: None  Review of Systems:     Review of Systems   Constitutional: Negative for chills and fever  Respiratory: Negative for shortness of breath  Cardiovascular: Negative for chest pain  Gastrointestinal: Negative for abdominal pain  Neurological: Negative for headaches  Past Medical History:     No past medical history on file     Past Surgical History:     Past Surgical History:   Procedure Laterality Date     SECTION      3 times      Social History:        Social History     Socioeconomic History    Marital status: Single     Spouse name: None    Number of children: None    Years of education: None    Highest education level: None   Occupational History    None   Social Needs    Financial resource strain: Not hard at all   [place] insecurity:     Worry: Never true     Inability: None    Transportation needs:     Medical: None     Non-medical: None   Tobacco Use    Smoking status: Current Every Day Smoker     Packs/day: 0 50    Smokeless tobacco: Never Used   Substance and Sexual Activity    Alcohol use: Never     Frequency: Never    Drug use: Never    Sexual activity: None   Lifestyle    Physical activity:     Days per week: None     Minutes per session: None    Stress: None   Relationships    Social connections:     Talks on phone: None     Gets together: None     Attends Oriental orthodox service: None     Active member of club or organization: None     Attends meetings of clubs or organizations: None     Relationship status: None    Intimate partner violence:     Fear of current or ex partner: None     Emotionally abused: None     Physically abused: None     Forced sexual activity: None   Other Topics Concern    None   Social History Narrative    None      Family History:     Family History   Problem Relation Age of Onset    Prostate cancer Father [de-identified]    No Known Problems Mother     No Known Problems Sister     No Known Problems Daughter     No Known Problems Maternal Grandmother     No Known Problems Maternal Grandfather     No Known Problems Paternal Grandmother     No Known Problems Paternal Grandfather     No Known Problems Sister     No Known Problems Sister     No Known Problems Daughter     No Known Problems Daughter     No Known Problems Maternal Aunt     No Known Problems Maternal Aunt     No Known Problems Maternal Aunt     No Known Problems Maternal Aunt     No Known Problems Maternal Aunt     No Known Problems Paternal Aunt     No Known Problems Paternal Aunt       Current Medications:     Current Outpatient Medications   Medication Sig Dispense Refill    famotidine (PEPCID) 20 mg tablet Take 1 tablet (20 mg total) by mouth 2 (two) times a day 90 tablet 2    meclizine (ANTIVERT) 25 mg tablet Take 1 tablet (25 mg total) by mouth daily 30 tablet 1    naproxen (NAPROSYN) 500 mg tablet Take 1 tablet (500 mg total) by mouth 2 (two) times a day with meals 10 tablet 0    trolamine salicylate (ASPERCREME) 10 % cream Apply topically as needed for muscle/joint pain 85 g 0     No current facility-administered medications for this visit  Allergies:      Allergies   Allergen Reactions    Penicillins Rash     Will get urine infections      Physical Exam:     /90 (BP Location: Left arm, Patient Position: Sitting, Cuff Size: Large)   Pulse 62   Temp 98 2 °F (36 8 °C) (Tympanic)   Resp 16 Ht 5' 6" (1 676 m)   Wt 81 6 kg (180 lb)   BMI 29 05 kg/m²     Physical Exam   HENT:   Head: Normocephalic and atraumatic  Eyes: Conjunctivae are normal    Neck: Normal range of motion  Cardiovascular: Normal rate, regular rhythm and normal heart sounds  Pulmonary/Chest: Effort normal and breath sounds normal    Abdominal: Soft  Bowel sounds are normal  Tenderness: LUQ pain mildly    Musculoskeletal: Normal range of motion  Neurological: She is alert  Skin: Skin is warm and dry  Psychiatric: She has a normal mood and affect  Vitals reviewed        Grecia Hernandez MD  [address] [place] [address]

## 2020-02-24 NOTE — PATIENT INSTRUCTIONS

## 2020-02-24 NOTE — ASSESSMENT & PLAN NOTE
Tobacco Cessation Counseling: Tobacco cessation counseling and education was provided  The patient is sincerely urged to quit consumption of tobacco  She is ready to quit tobacco  The numerous health risks of tobacco consumption were discussed  Pt tried the nicotine patches and plans to continue using them soon  She has no quit date yet

## 2020-02-25 ENCOUNTER — HOSPITAL ENCOUNTER (OUTPATIENT)
Dept: CT IMAGING | Facility: HOSPITAL | Age: 60
Discharge: HOME | End: 2020-02-25
Payer: COMMERCIAL

## 2020-02-25 DIAGNOSIS — F17.200 TOBACCO DEPENDENCE: ICD-10-CM

## 2020-02-25 DIAGNOSIS — Z12.2 ENCOUNTER FOR SCREENING FOR LUNG CANCER: ICD-10-CM

## 2020-02-25 PROCEDURE — G0297 LDCT FOR LUNG CA SCREEN: HCPCS

## 2020-02-26 ENCOUNTER — TRANSCRIBE ORDERS (OUTPATIENT)
Dept: FAMILY MEDICINE CLINIC | Facility: CLINIC | Age: 60
End: 2020-02-26

## 2020-02-26 DIAGNOSIS — Z12.11 COLON CANCER SCREENING: Primary | ICD-10-CM

## 2020-02-27 ENCOUNTER — TRANSCRIBE ORDERS (OUTPATIENT)
Dept: ADMINISTRATIVE | Facility: HOSPITAL | Age: 60
End: 2020-02-27

## 2020-03-01 ENCOUNTER — TELEPHONE (OUTPATIENT)
Dept: FAMILY MEDICINE CLINIC | Facility: CLINIC | Age: 60
End: 2020-03-01

## 2020-03-01 DIAGNOSIS — N30.00 ACUTE CYSTITIS WITHOUT HEMATURIA: Primary | ICD-10-CM

## 2020-03-01 LAB
APPEARANCE UR: CLEAR
BACTERIA UR CULT: ABNORMAL
BACTERIA UR QL AUTO: ABNORMAL /HPF
BILIRUB UR QL STRIP: NEGATIVE
COLOR UR: YELLOW
GLUCOSE UR QL STRIP: NEGATIVE
HGB UR QL STRIP: NEGATIVE
HYALINE CASTS #/AREA URNS LPF: ABNORMAL /LPF
KETONES UR QL STRIP: NEGATIVE
LEUKOCYTE ESTERASE UR QL STRIP: NEGATIVE
NITRITE UR QL STRIP: POSITIVE
PH UR STRIP: 5.5 [PH] (ref 5–8)
PROT UR QL STRIP: NEGATIVE
RBC #/AREA URNS HPF: ABNORMAL /HPF
SP GR UR STRIP: 1.02 (ref 1–1.03)
SQUAMOUS #/AREA URNS HPF: ABNORMAL /HPF
WBC #/AREA URNS HPF: ABNORMAL /HPF

## 2020-03-01 RX ORDER — NITROFURANTOIN 25; 75 MG/1; MG/1
100 CAPSULE ORAL 2 TIMES DAILY
Qty: 10 CAPSULE | Refills: 0 | Status: SHIPPED | OUTPATIENT
Start: 2020-03-01 | End: 2020-03-06

## 2020-03-05 ENCOUNTER — TELEPHONE (OUTPATIENT)
Dept: FAMILY MEDICINE CLINIC | Facility: CLINIC | Age: 60
End: 2020-03-05

## 2020-03-05 NOTE — TELEPHONE ENCOUNTER
Patient of Dr Audi Alcaraz requesting a call abck regarding test results from specialist visit and concerns about scheduling another  appt to Rectal specialist   Please return call to [phone]

## 2020-03-06 NOTE — TELEPHONE ENCOUNTER
Spoke to pt's relative and he states pt is at work  They will like a call back as to where they can go to get Pt's colonoscopy done  He reports he had spoken to someone from the front office and was once directed to [facility] but they were told on arrival at the [facility] office that was not supposed to be the venue  Kindly reach out to pt and have this clarified or please help schedule her colonoscopy with another provider      Thanks

## 2020-03-09 NOTE — TELEPHONE ENCOUNTER
Left message for patient to let her know [facility] will be contacting her to ask OA Questions If she needs assistance due to language Barrier, she can call me to answer questions

## 2020-03-10 ENCOUNTER — TELEPHONE (OUTPATIENT)
Dept: FAMILY MEDICINE CLINIC | Facility: CLINIC | Age: 60
End: 2020-03-10

## 2020-03-10 DIAGNOSIS — M25.40 PAINFUL SWELLING OF JOINT: ICD-10-CM

## 2020-03-10 NOTE — TELEPHONE ENCOUNTER
This patient was screened with the Gastro OA Intake questions and passed  Please schedule colonoscopy when possible  Thanks! !

## 2020-03-12 ENCOUNTER — TELEPHONE (OUTPATIENT)
Dept: FAMILY MEDICINE CLINIC | Facility: CLINIC | Age: 60
End: 2020-03-12

## 2020-03-12 DIAGNOSIS — M25.40 PAINFUL SWELLING OF JOINT: ICD-10-CM

## 2020-03-12 RX ORDER — FAMOTIDINE 20 MG/1
20 TABLET, FILM COATED ORAL 2 TIMES DAILY
Qty: 90 TABLET | Refills: 2 | Status: SHIPPED | OUTPATIENT
Start: 2020-03-12 | End: 2020-08-20 | Stop reason: SDUPTHER

## 2020-03-12 NOTE — TELEPHONE ENCOUNTER
Patients boy friend called to check on refill request for Famotidine, he said he left vm message last week on Rx line

## 2020-03-15 DIAGNOSIS — M25.40 PAINFUL SWELLING OF JOINT: ICD-10-CM

## 2020-03-15 RX ORDER — FAMOTIDINE 20 MG/1
20 TABLET, FILM COATED ORAL 2 TIMES DAILY
Qty: 180 TABLET | Refills: 1 | Status: SHIPPED | OUTPATIENT
Start: 2020-03-15 | End: 2020-08-20 | Stop reason: SDUPTHER

## 2020-03-19 ENCOUNTER — TELEPHONE (OUTPATIENT)
Dept: GASTROENTEROLOGY | Facility: CLINIC | Age: 60
End: 2020-03-19

## 2020-08-20 ENCOUNTER — OFFICE VISIT (OUTPATIENT)
Dept: FAMILY MEDICINE CLINIC | Facility: CLINIC | Age: 60
End: 2020-08-20

## 2020-08-20 VITALS
DIASTOLIC BLOOD PRESSURE: 70 MMHG | HEART RATE: 72 BPM | SYSTOLIC BLOOD PRESSURE: 102 MMHG | BODY MASS INDEX: 28.79 KG/M2 | WEIGHT: 178.4 LBS | RESPIRATION RATE: 18 BRPM | TEMPERATURE: 97.8 F

## 2020-08-20 DIAGNOSIS — M54.41 ACUTE RIGHT-SIDED LOW BACK PAIN WITH RIGHT-SIDED SCIATICA: ICD-10-CM

## 2020-08-20 DIAGNOSIS — Z78.0 POSTMENOPAUSAL STATE: Primary | ICD-10-CM

## 2020-08-20 DIAGNOSIS — M25.40 PAINFUL SWELLING OF JOINT: ICD-10-CM

## 2020-08-20 DIAGNOSIS — M15.9 OSTEOARTHRITIS OF MULTIPLE JOINTS, UNSPECIFIED OSTEOARTHRITIS TYPE: ICD-10-CM

## 2020-08-20 PROCEDURE — 99213 OFFICE O/P EST LOW 20 MIN: CPT | Performed by: FAMILY MEDICINE

## 2020-08-20 PROCEDURE — 4004F PT TOBACCO SCREEN RCVD TLK: CPT | Performed by: FAMILY MEDICINE

## 2020-08-20 RX ORDER — NAPROXEN 500 MG/1
500 TABLET ORAL 2 TIMES DAILY WITH MEALS
Qty: 10 TABLET | Refills: 0 | Status: SHIPPED | OUTPATIENT
Start: 2020-08-20 | End: 2021-04-27 | Stop reason: ALTCHOICE

## 2020-08-20 RX ORDER — FAMOTIDINE 20 MG/1
20 TABLET, FILM COATED ORAL 2 TIMES DAILY
Qty: 180 TABLET | Refills: 1 | Status: SHIPPED | OUTPATIENT
Start: 2020-08-20 | End: 2020-11-24 | Stop reason: SDUPTHER

## 2020-08-20 NOTE — PATIENT INSTRUCTIONS
Back Pain   WHAT YOU NEED TO KNOW:   Back pain is common  It can be caused by many conditions, such as arthritis or the breakdown of spinal discs  Your risk for back pain is increased by injuries, lack of activity, or repeated bending and twisting  You may feel sore or stiff on one or both sides of your back  The pain may spread to your buttocks or thighs  DISCHARGE INSTRUCTIONS:   Medicines:   · NSAIDs  help decrease swelling and pain  This medicine is available with or without a doctor's order  NSAIDs can cause stomach bleeding or kidney problems in certain people  If you take blood thinner medicine, always ask your healthcare provider if NSAIDs are safe for you  Always read the medicine label and follow directions  · Acetaminophen  decreases pain  It is available without a doctor's order  Ask how much to take and how often to take it  Follow directions  Acetaminophen can cause liver damage if not taken correctly  · Prescription pain medicine  may be given  Ask your healthcare provider how to take this medicine safely  · Take your medicine as directed  Contact your healthcare provider if you think your medicine is not helping or if you have side effects  Tell him or her if you are allergic to any medicine  Keep a list of the medicines, vitamins, and herbs you take  Include the amounts, and when and why you take them  Bring the list or the pill bottles to follow-up visits  Carry your medicine list with you in case of an emergency  Follow up with your healthcare provider in 2 weeks, or as directed:  Write down your questions so you remember to ask them during your visits  How to manage your back pain:   · Apply ice  on your back or affected area for 15 to 20 minutes every hour or as directed  Use an ice pack, or put crushed ice in a plastic bag  Cover it with a towel  Ice helps prevent tissue damage and decreases pain      · Apply heat  on your back or affected area for 20 to 30 minutes every 2 hours for as many days as directed  Heat helps decrease pain and muscle spasms  · Stay active  as much as you can without causing more pain  Bed rest could make your back pain worse  Avoid heavy lifting until your pain is gone  Return to the emergency department if:   · You have pain, numbness, or weakness in one or both legs  · Your pain becomes so severe that you cannot walk  · You cannot control your urine or bowel movements  · You have severe back pain with chest pain  · You have severe back pain, nausea, and vomiting  · You have severe back pain that spreads to your side or genital area  Contact your healthcare provider if:   · You have back pain that does not get better with rest and pain medicine  · You have a fever  · You have pain that worsens when you are on your back or when you rest     · You have pain that worsens when you cough or sneeze  · You lose weight without trying  · You have questions or concerns about your condition or care  © 2017 [address] Information is for End User's use only and may not be sold, redistributed or otherwise used for commercial purposes  All illustrations and images included in CareNotes® are the copyrighted property of [employer]  or Wilmer Wilde  The above information is an  only  It is not intended as medical advice for individual conditions or treatments  Talk to your doctor, nurse or pharmacist before following any medical regimen to see if it is safe and effective for you

## 2020-08-20 NOTE — PROGRESS NOTES
Assessment/Plan:        Problem List Items Addressed This Visit        Musculoskeletal and Integument    Osteoarthritis of multiple joints    Relevant Medications    diclofenac sodium (VOLTAREN) 1 %       Other        Relevant Medications    naproxen (NAPROSYN) 500 mg tablet    famotidine (PEPCID) 20 mg tablet, refill at pt's request    Acute right-sided low back pain with sciatica     Acute on Chronic  No sensory deficits  No red flags noted (new onset bowel or bladder incontinence, urinary retention, loss of anal sphincter tone, saddle anesthesia, h/o of metastatic cancer, or suspected spinal infection)   Likely MSK in nature  Patient educated and reassurance provided  Explained that the prognosis is often good and self limiting   Initiate trial of NSAIDs and Voltaren gel   Referral to PT provided at pt's request  F/u in 4- 6 weeks   Imaging not warranted now, may consider if pain not improved on follow up         Relevant Orders    Ambulatory referral to Physical Therapy    Postmenopausal state - Primary     Reports having period in may after she run out of self administered HRT for hot flushes  Currently off HRT after trial of over 1 year  Reports breast tenderness and concerns of possibly having another period soon  However denies current hot flushes off HRT for 4 months  Discussed monitoring off HRT for the next 3 months and observing symptoms  Pt to bring medication to next appointment/follow up  RTC precautions discussed                 Subjective:      Patient ID: Yadi Sim is a 61 y o  female  HPI   61 y o female here requesting med refill and also has concerns about HRT  Pt reports she has been taking HRT from home [place] for over a year specifically for post menopausal symptoms(hot flushes)   This was not prescribed  Pt reports she run out of these medications in April of this year and pt did have a period in May of this year after no periods since May of 2018  Period lasted 7 days    Pt reports she is currently having breast tenderness, sx she experiences right before she gets periods  Pt currently denies any vaginal dryness or hot flushes  Back pain: This is chronic  Localized to right lower back  Dull ache that radiates to buttock of same side and posterior right thigh  Pain is worse with rising form a seated position  Trial of tylenol provided very minimal relief  Pt denies falls or trauma to her lower back  She is requesting referral to PT  The following portions of the patient's history were reviewed and updated as appropriate: She  has no past medical history on file  She   Patient Active Problem List    Diagnosis Date Noted    Postmenopausal state 2020    Tobacco dependence 2020    Acute right-sided low back pain with sciatica 2020    Need for hepatitis C screening test 2020    Chest pain, atypical 2020    Painful swelling of joint 2019    Vertigo 2019    Chronic gastritis without bleeding 2019    Osteoarthritis of multiple joints 2019    Encounter for screening for lung cancer 2019     She  has a past surgical history that includes  section  Her family history includes No Known Problems in her daughter, daughter, daughter, maternal aunt, maternal aunt, maternal aunt, maternal aunt, maternal aunt, maternal grandfather, maternal grandmother, mother, paternal aunt, paternal aunt, paternal grandfather, paternal grandmother, sister, sister, and sister; Prostate cancer (age of onset: [de-identified]) in her father  She  reports that she has been smoking  She has been smoking about 0 50 packs per day  She has never used smokeless tobacco  She reports that she does not drink alcohol or use drugs    Current Outpatient Medications   Medication Sig Dispense Refill    diclofenac sodium (VOLTAREN) 1 % Apply 2 g topically 4 (four) times a day 1 Tube 2    famotidine (PEPCID) 20 mg tablet Take 1 tablet (20 mg total) by mouth 2 (two) times a day 180 tablet 1    meclizine (ANTIVERT) 25 mg tablet Take 1 tablet (25 mg total) by mouth daily 30 tablet 1    naproxen (NAPROSYN) 500 mg tablet Take 1 tablet (500 mg total) by mouth 2 (two) times a day with meals 10 tablet 0     No current facility-administered medications for this visit  Current Outpatient Medications on File Prior to Visit   Medication Sig    meclizine (ANTIVERT) 25 mg tablet Take 1 tablet (25 mg total) by mouth daily    [DISCONTINUED] famotidine (PEPCID) 20 mg tablet Take 1 tablet (20 mg total) by mouth 2 (two) times a day    [DISCONTINUED] famotidine (PEPCID) 20 mg tablet Take 1 tablet (20 mg total) by mouth 2 (two) times a day    [DISCONTINUED] naproxen (NAPROSYN) 500 mg tablet Take 1 tablet (500 mg total) by mouth 2 (two) times a day with meals    [DISCONTINUED] trolamine salicylate (ASPERCREME) 10 % cream Apply topically as needed for muscle/joint pain     No current facility-administered medications on file prior to visit  She is allergic to penicillins       Review of Systems   Constitutional: Negative for appetite change, chills and fever  Genitourinary: Negative for vaginal bleeding  Musculoskeletal: Positive for back pain  Breast tenderness    Objective:      /70 (BP Location: Left arm, Patient Position: Sitting, Cuff Size: Standard)   Pulse 72   Temp 97 8 °F (36 6 °C) (Tympanic)   Resp 18   Wt 80 9 kg (178 lb 6 4 oz)   BMI 28 79 kg/m²          Physical Exam  Constitutional:       Appearance: Normal appearance  HENT:      Head: Normocephalic and atraumatic  Eyes:      Extraocular Movements: Extraocular movements intact  Neck:      Musculoskeletal: Normal range of motion  Cardiovascular:      Rate and Rhythm: Normal rate and regular rhythm  Pulmonary:      Effort: Pulmonary effort is normal       Breath sounds: Normal breath sounds  No wheezing or rales  Musculoskeletal: Normal range of motion        Comments: Right paraspinal muscle tenderness   Neurological:      General: No focal deficit present     Psychiatric:         Mood and Affect: Mood normal

## 2020-08-20 NOTE — ASSESSMENT & PLAN NOTE
Acute on Chronic  No sensory deficits  No red flags noted (new onset bowel or bladder incontinence, urinary retention, loss of anal sphincter tone, saddle anesthesia, h/o of metastatic cancer, or suspected spinal infection)   Likely MSK in nature  Patient educated and reassurance provided    Explained that the prognosis is often good and self limiting   Initiate trial of NSAIDs and Voltaren gel   Referral to PT provided at pt's request  F/u in 4- 6 weeks   Imaging not warranted now, may consider if pain not improved on follow up

## 2020-08-20 NOTE — ASSESSMENT & PLAN NOTE
Reports having period in may after she run out of self administered HRT for hot flushes  Currently off HRT after trial of over 1 year  Reports breast tenderness and concerns of possibly having another period soon  However denies current hot flushes off HRT for 4 months  Discussed monitoring off HRT for the next 3 months and observing symptoms  Pt to bring medication to next appointment/follow up  RTC precautions discussed

## 2020-08-21 ENCOUNTER — TELEPHONE (OUTPATIENT)
Dept: FAMILY MEDICINE CLINIC | Facility: CLINIC | Age: 60
End: 2020-08-21

## 2020-08-24 ENCOUNTER — TELEPHONE (OUTPATIENT)
Dept: FAMILY MEDICINE CLINIC | Facility: CLINIC | Age: 60
End: 2020-08-24

## 2020-08-24 NOTE — TELEPHONE ENCOUNTER
Patient was seen 8/20/20, Pepcid was ordered however insurance does not cover  Are you able to change to Omeprazole?

## 2020-09-01 ENCOUNTER — EVALUATION (OUTPATIENT)
Dept: PHYSICAL THERAPY | Facility: CLINIC | Age: 60
End: 2020-09-01
Payer: COMMERCIAL

## 2020-09-01 DIAGNOSIS — M54.41 ACUTE RIGHT-SIDED LOW BACK PAIN WITH RIGHT-SIDED SCIATICA: Primary | ICD-10-CM

## 2020-09-01 PROCEDURE — 97161 PT EVAL LOW COMPLEX 20 MIN: CPT | Performed by: PHYSICAL THERAPIST

## 2020-09-01 NOTE — PROGRESS NOTES
PT Evaluation     Today's date: 2020  Patient name: Michael Mcintosh  : 1960  MRN: [id number]  Referring provider: Abby Aj MD  Dx:   Encounter Diagnosis     ICD-10-CM    1  Acute right-sided low back pain with right-sided sciatica  M54 41                   Assessment  Assessment details: The patient presents with s/s consistent with right hip OA and SI dysfunction  The patient was educated on prognosis and rehab and instructed in an HEP for home  Scissor technique used to correct pelvic alignment  All questions were answered to the patient's satisfaction  The patient would benefit from skilled PT to address her deficits and meet her goals  Impairments: abnormal muscle tone, abnormal or restricted ROM, impaired physical strength and pain with function  Understanding of Dx/Px/POC: good   Prognosis: good    Goals  STG: To be completed in 4 weeks  1  The patient will report no more than 5/10 pain during all adls  2  The patient will increase abdominal MMT to 4-/5 when walking in the grocery store  3  The patient is compliant with her HEP  LTG: To be completed in 8 weeks    1  The patient will report no more than 1/10 pain during all adls  2  The patient will increase bridge hold to 1 minute without fatigue or compensation for increased endurance during ADLs  3  The patient will increase LE strength to 4+/5 MMT when ambulating more than 30 minutes         Plan  Patient would benefit from: skilled physical therapy  Planned modality interventions: low level laser therapy  Planned therapy interventions: joint mobilization, manual therapy, abdominal trunk stabilization, neuromuscular re-education, patient education, self care, strengthening, stretching, therapeutic activities, therapeutic exercise, therapeutic training and home exercise program  Frequency: 2x week  Duration in visits: 12  Plan of Care beginning date: 2020  Treatment plan discussed with: patient        Subjective Evaluation    History of Present Illness  Date of onset: 2020  Mechanism of injury: Yadi Sim is a 61 y o  female who presents with chronic right-sided LBP with right-sided sciatica of insidious onset  The patient does note a history of a fall on the right side 3 years ago  The patient denies parasthesias or trouble sleeping at night  The patient currently struggles with bending over and walking  PMH is insignificant  Recurrent probem    Pain  Current pain ratin  At best pain ratin  At worst pain ratin  Quality: sharp  Relieving factors: medications  Aggravating factors: walking  Progression: no change    Treatments  Previous treatment: medication  Current treatment: physical therapy  Patient Goals  Patient goals for therapy: decreased pain and independence with ADLs/IADLs          Objective     Active Range of Motion     Lumbar   Flexion:  Restriction level: moderate  Extension:  Restriction level: minimal  Left lateral flexion:  Restriction level: minimal  Right lateral flexion:  Restriction level: minimal  Left rotation:  Restriction level: moderate  Right rotation:  Restriction level: moderate    Strength/Myotome Testing     Left Hip   Planes of Motion   Flexion: 4-  Abduction: 3+  Adduction: 4-    Right Hip   Planes of Motion   Flexion: 4-  Abduction: 3+  Adduction: 4-    Left Knee   Flexion: 4+  Extension: 4    Right Knee   Flexion: 4+  Extension: 4    Left Ankle/Foot   Dorsiflexion: 4+  Plantar flexion: 4    Right Ankle/Foot   Dorsiflexion: 4+  Plantar flexion: 4    Muscle Activation   Patient able to activate left transverse abdominals and right transverse abdominals  Additional Muscle Activation Details  Abd MMT: 3/5  Bridge hold: 10 seconds    Tests     Lumbar     Left   Negative crossed SLR, passive SLR and slump test      Right   Negative crossed SLR and passive SLR  Left Hip   Positive FLORIDALMA, FADIR, long sit and piriformis  90/90 SLR: Positive       Right Hip   Positive FLORIDALMA, FADIR and piriformis  90/90 SLR: Positive                Precautions: none      Manuals 9/01            Long Test Test  1x2                                                   Neuro Re-Ed             TA Set 5"x10            Bridges 2x10            Ball Squeeze 5"x15            Supine clamshells 2x10 RTB                                                   Ther Ex             Mini Squats             B Piriformis St               B HS St                                                                                Ther Activity                                       Gait Training                                       Modalities

## 2020-09-03 ENCOUNTER — OFFICE VISIT (OUTPATIENT)
Dept: FAMILY MEDICINE CLINIC | Facility: CLINIC | Age: 60
End: 2020-09-03

## 2020-09-03 VITALS
WEIGHT: 175 LBS | DIASTOLIC BLOOD PRESSURE: 82 MMHG | BODY MASS INDEX: 28.12 KG/M2 | HEIGHT: 66 IN | SYSTOLIC BLOOD PRESSURE: 118 MMHG | TEMPERATURE: 99 F | HEART RATE: 72 BPM | RESPIRATION RATE: 18 BRPM

## 2020-09-03 DIAGNOSIS — M54.42 ACUTE RIGHT-SIDED LOW BACK PAIN WITH BILATERAL SCIATICA: Primary | ICD-10-CM

## 2020-09-03 DIAGNOSIS — F43.21 ADJUSTMENT REACTION, DEPRESSIVE, BRIEF: ICD-10-CM

## 2020-09-03 DIAGNOSIS — M54.41 ACUTE RIGHT-SIDED LOW BACK PAIN WITH BILATERAL SCIATICA: Primary | ICD-10-CM

## 2020-09-03 DIAGNOSIS — M15.9 OSTEOARTHRITIS OF MULTIPLE JOINTS, UNSPECIFIED OSTEOARTHRITIS TYPE: ICD-10-CM

## 2020-09-03 PROCEDURE — 3008F BODY MASS INDEX DOCD: CPT | Performed by: FAMILY MEDICINE

## 2020-09-03 PROCEDURE — 4004F PT TOBACCO SCREEN RCVD TLK: CPT | Performed by: FAMILY MEDICINE

## 2020-09-03 PROCEDURE — 99213 OFFICE O/P EST LOW 20 MIN: CPT | Performed by: FAMILY MEDICINE

## 2020-09-03 NOTE — PROGRESS NOTES
Assessment/Plan:       Problem List Items Addressed This Visit        Musculoskeletal and Integument    Osteoarthritis of multiple joints       Other    Acute right-sided low back pain with sciatica - Primary     Improving s/p start of PT  Pt encouraged to continue PT with trial of exercises at home           Adjustment reaction, depressive, brief     Emotional support provided  Pt denies SI/HI/hallucinations  ED precautions reviewed  Subjective:      Patient ID: Jono Quinn is a 61 y o  female  HPI  Pt here very emotional stating that she wanted to talk to me about what she is going through at home  Pt reports last week she found out her SO of almost 2 years is having an affair  This has  affected her to the point that her body "feels hot" and she feels all her efforts in her relationship has been futile  She denies SI/HI/ HA  Pt intents to move to her stay with her dtr in [address] [place] [address] on 10/02/2020  Otherwise, back pain is significantly improved s/p start of PT  Her visit to me today is to let me know she is moving to [place] and also to enquire about need for COVID test prior to leaving  I stated current CDC recs of no need for testing in an asx pt  The following portions of the patient's history were reviewed and updated as appropriate: She  has no past medical history on file  She   Patient Active Problem List    Diagnosis Date Noted    Postmenopausal state 2020    Tobacco dependence 2020    Acute right-sided low back pain with sciatica 2020    Need for hepatitis C screening test 2020    Chest pain, atypical 2020    Painful swelling of joint 2019    Vertigo 2019    Chronic gastritis without bleeding 2019    Osteoarthritis of multiple joints 2019    Encounter for screening for lung cancer 2019     She  has a past surgical history that includes  section    Her family history includes No Known Problems in her daughter, daughter, daughter, maternal aunt, maternal aunt, maternal aunt, maternal aunt, maternal aunt, maternal grandfather, maternal grandmother, mother, paternal aunt, paternal aunt, paternal grandfather, paternal grandmother, sister, sister, and sister; Prostate cancer (age of onset: [de-identified]) in her father  She  reports that she has been smoking  She has been smoking about 0 50 packs per day  She has never used smokeless tobacco  She reports that she does not drink alcohol or use drugs  Current Outpatient Medications   Medication Sig Dispense Refill    diclofenac sodium (VOLTAREN) 1 % Apply 2 g topically 4 (four) times a day 1 Tube 2    famotidine (PEPCID) 20 mg tablet Take 1 tablet (20 mg total) by mouth 2 (two) times a day 180 tablet 1    meclizine (ANTIVERT) 25 mg tablet Take 1 tablet (25 mg total) by mouth daily 30 tablet 1    naproxen (NAPROSYN) 500 mg tablet Take 1 tablet (500 mg total) by mouth 2 (two) times a day with meals 10 tablet 0     No current facility-administered medications for this visit  Current Outpatient Medications on File Prior to Visit   Medication Sig    diclofenac sodium (VOLTAREN) 1 % Apply 2 g topically 4 (four) times a day    famotidine (PEPCID) 20 mg tablet Take 1 tablet (20 mg total) by mouth 2 (two) times a day    meclizine (ANTIVERT) 25 mg tablet Take 1 tablet (25 mg total) by mouth daily    naproxen (NAPROSYN) 500 mg tablet Take 1 tablet (500 mg total) by mouth 2 (two) times a day with meals     No current facility-administered medications on file prior to visit  She is allergic to penicillins       Review of Systems   Constitutional: Negative for appetite change, chills, fatigue and fever  HENT: Negative for congestion  Respiratory: Negative for cough and shortness of breath  Gastrointestinal: Negative for abdominal pain and constipation  Neurological: Negative for headaches           Objective:      /82 (BP Location: Left arm, Patient Position: Sitting, Cuff Size: Standard)   Pulse 72   Temp 99 °F (37 2 °C) (Tympanic)   Resp 18   Ht 5' 6" (1 676 m)   Wt 79 4 kg (175 lb)   BMI 28 25 kg/m²          Physical Exam  Constitutional:       Appearance: Normal appearance  HENT:      Head: Normocephalic and atraumatic  Nose: Nose normal    Eyes:      Extraocular Movements: Extraocular movements intact  Cardiovascular:      Rate and Rhythm: Normal rate  Pulmonary:      Effort: Pulmonary effort is normal  No respiratory distress  Musculoskeletal: Normal range of motion  Neurological:      Mental Status: Mental status is at baseline     Psychiatric:      Comments: Sad affect, pt tearful during the encounter

## 2020-09-11 ENCOUNTER — OFFICE VISIT (OUTPATIENT)
Dept: PHYSICAL THERAPY | Facility: CLINIC | Age: 60
End: 2020-09-11
Payer: COMMERCIAL

## 2020-09-11 DIAGNOSIS — M54.41 ACUTE RIGHT-SIDED LOW BACK PAIN WITH RIGHT-SIDED SCIATICA: Primary | ICD-10-CM

## 2020-09-11 PROCEDURE — 97112 NEUROMUSCULAR REEDUCATION: CPT | Performed by: PHYSICAL THERAPIST

## 2020-09-11 PROCEDURE — 97140 MANUAL THERAPY 1/> REGIONS: CPT | Performed by: PHYSICAL THERAPIST

## 2020-09-11 PROCEDURE — 97110 THERAPEUTIC EXERCISES: CPT | Performed by: PHYSICAL THERAPIST

## 2020-09-11 NOTE — PROGRESS NOTES
Daily Note     Today's date: 2020  Patient name: Hampton Frankel  : 1960  MRN: [id number]  Referring provider: Danita Borden MD  Dx:   Encounter Diagnosis     ICD-10-CM    1  Acute right-sided low back pain with right-sided sciatica  M54 41                   Subjective: The patient returns after IE reporting compliance with her HEP except for yesterday  She notes a "little right hip pain" today  Objective: See treatment diary below      Assessment: The patient was positive with the long sit test; scissor technique performed for correction  Good tolerance to core strengthening exercises  Plan: Continue per plan of care        Precautions: none      Manuals            Long Test Test  1x2 1x2                                                  Neuro Re-Ed             TA Set 5"x10 5"x15           Bridges 2x10 2x12           Ball Squeeze 5"x15 5"x20 2 2#           Supine clamshells 2x10 RTB 3x10 RTB                                                  Ther Ex             Mini Squats             B Piriformis St               B HS St                                                                                Ther Activity                                       Gait Training                                       Modalities

## 2020-09-15 ENCOUNTER — TELEPHONE (OUTPATIENT)
Dept: FAMILY MEDICINE CLINIC | Facility: CLINIC | Age: 60
End: 2020-09-15

## 2020-09-15 ENCOUNTER — OFFICE VISIT (OUTPATIENT)
Dept: PHYSICAL THERAPY | Facility: CLINIC | Age: 60
End: 2020-09-15
Payer: COMMERCIAL

## 2020-09-15 DIAGNOSIS — M54.41 ACUTE RIGHT-SIDED LOW BACK PAIN WITH RIGHT-SIDED SCIATICA: Primary | ICD-10-CM

## 2020-09-15 PROCEDURE — 97110 THERAPEUTIC EXERCISES: CPT

## 2020-09-15 PROCEDURE — 97112 NEUROMUSCULAR REEDUCATION: CPT

## 2020-09-15 NOTE — TELEPHONE ENCOUNTER
Patient called requesting Dr Nick Urban to call back because patient has a few questions about medicaton Famotidine 20 mg   Patient can be reached at [phone]

## 2020-09-15 NOTE — TELEPHONE ENCOUNTER
I called pt and she inquired about need for quarantine when she moves to [place]  Quarantine may be beneficial but not required

## 2020-09-15 NOTE — PROGRESS NOTES
Daily Note     Today's date: 9/15/2020  Patient name: Marli Nix  : 1960  MRN: [id number]  Referring provider: Clyde Lafleur MD  Dx:   Encounter Diagnosis     ICD-10-CM    1  Acute right-sided low back pain with right-sided sciatica  M54 41                   Subjective: Pt reports "a little" hip pain  Objective: See treatment diary below      Assessment: No mal alignment noted today  Progressed pt w/ TE as charted w/ good response  Cues req for proper form during mini squats  Plan: Continue per plan of care        Precautions: none      Manuals 9/01 9/11 9/15          Long Test Test  1x2 1x2 NP                                                 Neuro Re-Ed             TA Set 5"x10 5"x15 5"x15          Bridges 2x10 2x12 2x12          Ball Squeeze 5"x15 5"x20 2 2# 5"x20 2 2#          Supine clamshells 2x10 RTB 3x10 RTB 3x10 RTB                                                 Ther Ex             Mini Squats   15x          B Piriformis St     20"x3          B HS St     20"x3                                                                           Ther Activity                                       Gait Training                                       Modalities

## 2020-09-18 ENCOUNTER — OFFICE VISIT (OUTPATIENT)
Dept: PHYSICAL THERAPY | Facility: CLINIC | Age: 60
End: 2020-09-18
Payer: COMMERCIAL

## 2020-09-18 DIAGNOSIS — M54.41 ACUTE RIGHT-SIDED LOW BACK PAIN WITH RIGHT-SIDED SCIATICA: Primary | ICD-10-CM

## 2020-09-18 PROCEDURE — 97112 NEUROMUSCULAR REEDUCATION: CPT | Performed by: PHYSICAL THERAPIST

## 2020-09-18 PROCEDURE — 97140 MANUAL THERAPY 1/> REGIONS: CPT | Performed by: PHYSICAL THERAPIST

## 2020-09-18 PROCEDURE — 97110 THERAPEUTIC EXERCISES: CPT | Performed by: PHYSICAL THERAPIST

## 2020-09-18 NOTE — PROGRESS NOTES
Daily Note     Today's date: 2020  Patient name: Bandar Steel  : 1960  MRN: [id number]  Referring provider: Polly Mojica MD  Dx:   Encounter Diagnosis     ICD-10-CM    1  Acute right-sided low back pain with right-sided sciatica  M54 41        Start Time: 940  Stop Time: 1018  Total time in clinic (min): 38 minutes    Subjective: Pt reports falling yesterday and landing on her right hip and left hand  She noted pain immediately after but reports her pain levels are better today  Objective: See treatment diary below      Assessment: Patient still in alignment from IE  Good tolerance to core strengthening  Mild tenderness along lateral hip due to fall  Patient stated today is her last day as she is leaving the state due to relationship issues  Educated the patient on prognosis and prevention and instructed the patient to continue with her HEP  Plan: Continue per plan of care        Precautions: none      Manuals 9/01 9/11 9/15 9/18         Long Test Test  1x2 1x2 NP 1x1         Foam Roller: R ITB    5 min                                   Neuro Re-Ed             TA Set 5"x10 5"x15 5"x15 5"x20         Bridges 2x10 2x12 2x12 2x12         Ball Squeeze 5"x15 5"x20 2 2# 5"x20 2 2# 5"x25 2 2#         Supine clamshells 2x10 RTB 3x10 RTB 3x10 RTB 3x12 RTB                                                Ther Ex             Mini Squats   15x 2x10         B Piriformis St     20"x3 20"x4         B HS St     20"x3 20"x4         B SL SLR    1x23                                                             Ther Activity                                       Gait Training                                       Modalities

## 2020-11-09 ENCOUNTER — TELEPHONE (OUTPATIENT)
Dept: FAMILY MEDICINE CLINIC | Facility: CLINIC | Age: 60
End: 2020-11-09

## 2020-11-11 ENCOUNTER — TELEPHONE (OUTPATIENT)
Dept: FAMILY MEDICINE CLINIC | Facility: CLINIC | Age: 60
End: 2020-11-11

## 2020-11-24 ENCOUNTER — ANNUAL EXAM (OUTPATIENT)
Dept: FAMILY MEDICINE CLINIC | Facility: CLINIC | Age: 60
End: 2020-11-24

## 2020-11-24 VITALS
BODY MASS INDEX: 28.19 KG/M2 | RESPIRATION RATE: 16 BRPM | DIASTOLIC BLOOD PRESSURE: 72 MMHG | OXYGEN SATURATION: 97 % | HEIGHT: 66 IN | WEIGHT: 175.4 LBS | TEMPERATURE: 98.1 F | SYSTOLIC BLOOD PRESSURE: 100 MMHG | HEART RATE: 80 BPM

## 2020-11-24 DIAGNOSIS — Z01.419 ENCOUNTER FOR GYNECOLOGICAL EXAMINATION WITHOUT ABNORMAL FINDING: Primary | ICD-10-CM

## 2020-11-24 DIAGNOSIS — Z12.31 ENCOUNTER FOR SCREENING MAMMOGRAM FOR BREAST CANCER: ICD-10-CM

## 2020-11-24 DIAGNOSIS — M25.40 PAINFUL SWELLING OF JOINT: ICD-10-CM

## 2020-11-24 PROCEDURE — 3008F BODY MASS INDEX DOCD: CPT | Performed by: FAMILY MEDICINE

## 2020-11-24 PROCEDURE — 99396 PREV VISIT EST AGE 40-64: CPT | Performed by: FAMILY MEDICINE

## 2020-11-24 RX ORDER — FAMOTIDINE 20 MG/1
20 TABLET, FILM COATED ORAL 2 TIMES DAILY
Qty: 180 TABLET | Refills: 1 | Status: SHIPPED | OUTPATIENT
Start: 2020-11-24 | End: 2022-03-07 | Stop reason: SDUPTHER

## 2020-11-25 ENCOUNTER — TELEPHONE (OUTPATIENT)
Dept: FAMILY MEDICINE CLINIC | Facility: CLINIC | Age: 60
End: 2020-11-25

## 2020-11-25 DIAGNOSIS — Z12.31 ENCOUNTER FOR SCREENING MAMMOGRAM FOR BREAST CANCER: Primary | ICD-10-CM

## 2020-12-02 ENCOUNTER — TRANSCRIBE ORDERS (OUTPATIENT)
Dept: FAMILY MEDICINE CLINIC | Facility: CLINIC | Age: 60
End: 2020-12-02

## 2020-12-02 DIAGNOSIS — Z12.11 COLON CANCER SCREENING: Primary | ICD-10-CM

## 2020-12-22 ENCOUNTER — OFFICE VISIT (OUTPATIENT)
Dept: FAMILY MEDICINE CLINIC | Facility: CLINIC | Age: 60
End: 2020-12-22

## 2020-12-22 VITALS
DIASTOLIC BLOOD PRESSURE: 80 MMHG | WEIGHT: 178 LBS | BODY MASS INDEX: 28.61 KG/M2 | HEART RATE: 91 BPM | TEMPERATURE: 98.9 F | SYSTOLIC BLOOD PRESSURE: 120 MMHG | HEIGHT: 66 IN | RESPIRATION RATE: 18 BRPM | OXYGEN SATURATION: 99 %

## 2020-12-22 DIAGNOSIS — M94.0 COSTOCHONDRITIS, ACUTE: Primary | ICD-10-CM

## 2020-12-22 PROCEDURE — 99213 OFFICE O/P EST LOW 20 MIN: CPT | Performed by: FAMILY MEDICINE

## 2020-12-22 PROCEDURE — 4004F PT TOBACCO SCREEN RCVD TLK: CPT | Performed by: FAMILY MEDICINE

## 2020-12-22 PROCEDURE — 3008F BODY MASS INDEX DOCD: CPT | Performed by: FAMILY MEDICINE

## 2020-12-22 RX ORDER — METHOCARBAMOL 500 MG/1
500 TABLET, FILM COATED ORAL 4 TIMES DAILY
Qty: 56 TABLET | Refills: 0 | Status: SHIPPED | OUTPATIENT
Start: 2020-12-22 | End: 2021-01-26 | Stop reason: SDUPTHER

## 2021-01-22 ENCOUNTER — ANESTHESIA EVENT (OUTPATIENT)
Dept: GASTROENTEROLOGY | Facility: AMBULARY SURGERY CENTER | Age: 61
End: 2021-01-22

## 2021-01-26 ENCOUNTER — OFFICE VISIT (OUTPATIENT)
Dept: FAMILY MEDICINE CLINIC | Facility: CLINIC | Age: 61
End: 2021-01-26

## 2021-01-26 VITALS
BODY MASS INDEX: 28.5 KG/M2 | RESPIRATION RATE: 16 BRPM | OXYGEN SATURATION: 97 % | HEART RATE: 74 BPM | DIASTOLIC BLOOD PRESSURE: 78 MMHG | WEIGHT: 176.6 LBS | SYSTOLIC BLOOD PRESSURE: 104 MMHG | TEMPERATURE: 98.4 F

## 2021-01-26 DIAGNOSIS — F17.200 TOBACCO DEPENDENCE: ICD-10-CM

## 2021-01-26 DIAGNOSIS — Z71.6 ENCOUNTER FOR SMOKING CESSATION COUNSELING: ICD-10-CM

## 2021-01-26 DIAGNOSIS — M94.0 COSTOCHONDRITIS, ACUTE: Primary | ICD-10-CM

## 2021-01-26 DIAGNOSIS — F43.20 ADJUSTMENT DISORDER, UNSPECIFIED TYPE: ICD-10-CM

## 2021-01-26 DIAGNOSIS — Z11.52 ENCOUNTER FOR SCREENING FOR COVID-19: ICD-10-CM

## 2021-01-26 PROCEDURE — 99213 OFFICE O/P EST LOW 20 MIN: CPT | Performed by: FAMILY MEDICINE

## 2021-01-26 RX ORDER — METHOCARBAMOL 500 MG/1
500 TABLET, FILM COATED ORAL 4 TIMES DAILY
Qty: 56 TABLET | Refills: 0 | Status: SHIPPED | OUTPATIENT
Start: 2021-01-26 | End: 2021-02-05 | Stop reason: ALTCHOICE

## 2021-01-26 NOTE — ASSESSMENT & PLAN NOTE
Mood is stable   Reports needing time to process things and figure out what she wants to do  Emotional support  provided  Referral to therapy provided at pt's request  RTC precautions discussed

## 2021-01-26 NOTE — ASSESSMENT & PLAN NOTE
Currently asymptomatic  Requesting screening test for travel purpose, ordered  Will call pt with results    Pt aware to wear masks, clean/wipe commonly used surfaces and follow recs as per CDC guidelines

## 2021-01-26 NOTE — PROGRESS NOTES
Assessment/Plan:       Problem List Items Addressed This Visit        Musculoskeletal and Integument    Costochondritis, acute - Primary     Resolving  Refill on meds provided at pt's request   Side effects on meds discussed  She is aware and agreeable to not drive while on Robaxin  Relevant Medications    methocarbamol (ROBAXIN) 500 mg tablet       Other    Encounter for screening for COVID-19     Currently asymptomatic  Requesting screening test for travel purpose, ordered  Will call pt with results  Pt aware to wear masks, clean/wipe commonly used surfaces and follow recs as per CDC guidelines         Relevant Medications    nicotine (NICODERM CQ) 7 mg/24hr TD 24 hr patch    Other Relevant Orders    Novel Coronavirus (COVID-19), PCR [facility]    Tobacco dependence     Pt congratulated on quitting smoking  Refill on Nicotine patch provided at pt's request           Relevant Medications    nicotine (NICODERM CQ) 7 mg/24hr TD 24 hr patch    Adjustment disorder     Mood is stable   Reports needing time to process things and figure out what she wants to do  Emotional support  provided  Referral to therapy provided at pt's request  RTC precautions discussed          Relevant Medications    nicotine (NICODERM CQ) 7 mg/24hr TD 24 hr patch    Other Relevant Orders    Ambulatory referral to Psychology      Other Visit Diagnoses     Encounter for smoking cessation counseling        Relevant Medications    nicotine (NICODERM CQ) 7 mg/24hr TD 24 hr patch            Subjective:      Patient ID: Nik Rivas is a 61 y o  female  HPI  Pt here for follow up  Reports costochondritis has improved status post start of NSAIDs/Tylenol/Robaxin  She is needing to use medications on p r n  basis  She does admit patient pain while at work  Patient is a   Patient with history of tobacco dependent  She reports she quit smoking on Saturday 01/24    She is using nicotine patch requesting refills  She does admit to nausea occasionally due to recent abstinence from tobacco     Pt is requesting a COVID test because she is going to [facility] on 21 to take care of her 3week old granddaughter  Pt is however asymptomatic  Denies fever, chills, loss of taste or smell, cough, SOB, changes in bowel/bladder habits  Pt is requesting referral to Psychotherapy  She reports she found out her SO is unfaithful and this is affecting her relationship  She reports she plans to take sometime off and stay with her dtr and granddaughter in [place] to think about what she will want to do  In the meantime, she will like to start therapy  She denies any SI/HI/hallucinations  Denies being depressed  She resorts to prayer when she "feels down" and that is helping  The following portions of the patient's history were reviewed and updated as appropriate: She  has no past medical history on file  She   Patient Active Problem List    Diagnosis Date Noted    Costochondritis, acute 2020    Adjustment reaction, depressive, brief 2020    Postmenopausal state 2020    Tobacco dependence 2020    Acute right-sided low back pain with sciatica 2020    Need for hepatitis C screening test 2020    Chest pain, atypical 2020    Painful swelling of joint 2019    Vertigo 2019    Chronic gastritis without bleeding 2019    Osteoarthritis of multiple joints 2019    Visit for routine gyn exam 2019     She  has a past surgical history that includes  section  Her family history includes No Known Problems in her daughter, daughter, daughter, maternal aunt, maternal aunt, maternal aunt, maternal aunt, maternal aunt, maternal grandfather, maternal grandmother, mother, paternal aunt, paternal aunt, paternal grandfather, paternal grandmother, sister, sister, and sister; Prostate cancer (age of onset: [de-identified]) in her father    She  reports that she has been smoking  She has been smoking about 0 50 packs per day  She has never used smokeless tobacco  She reports that she does not drink alcohol or use drugs  Current Outpatient Medications   Medication Sig Dispense Refill    diclofenac sodium (VOLTAREN) 1 % Apply 2 g topically 4 (four) times a day 1 Tube 2    famotidine (PEPCID) 20 mg tablet Take 1 tablet (20 mg total) by mouth 2 (two) times a day 180 tablet 1    meclizine (ANTIVERT) 25 mg tablet Take 1 tablet (25 mg total) by mouth daily 30 tablet 1    methocarbamol (ROBAXIN) 500 mg tablet Take 1 tablet (500 mg total) by mouth 4 (four) times a day for 14 days 56 tablet 0    naproxen (NAPROSYN) 500 mg tablet Take 1 tablet (500 mg total) by mouth 2 (two) times a day with meals 10 tablet 0    nicotine (NICODERM CQ) 7 mg/24hr TD 24 hr patch Place 1 patch on the skin every 24 hours 28 patch 2     No current facility-administered medications for this visit  Current Outpatient Medications on File Prior to Visit   Medication Sig    diclofenac sodium (VOLTAREN) 1 % Apply 2 g topically 4 (four) times a day    famotidine (PEPCID) 20 mg tablet Take 1 tablet (20 mg total) by mouth 2 (two) times a day    meclizine (ANTIVERT) 25 mg tablet Take 1 tablet (25 mg total) by mouth daily    naproxen (NAPROSYN) 500 mg tablet Take 1 tablet (500 mg total) by mouth 2 (two) times a day with meals    [DISCONTINUED] methocarbamol (ROBAXIN) 500 mg tablet Take 1 tablet (500 mg total) by mouth 4 (four) times a day for 14 days     No current facility-administered medications on file prior to visit  She is allergic to penicillins       Review of Systems   Constitutional: Negative for appetite change, chills and fever  HENT: Negative for congestion  Respiratory: Negative for cough and shortness of breath  Cardiovascular: Negative for chest pain and palpitations  Gastrointestinal: Positive for nausea (occasionally)  Negative for abdominal pain, diarrhea and vomiting  Neurological: Negative for headaches  Objective:      /78 (BP Location: Left arm, Patient Position: Sitting, Cuff Size: Large)   Pulse 74   Temp 98 4 °F (36 9 °C) (Tympanic)   Resp 16   Wt 80 1 kg (176 lb 9 6 oz)   SpO2 97%   BMI 28 50 kg/m²          Physical Exam  Constitutional:       Appearance: Normal appearance  HENT:      Head: Normocephalic and atraumatic  Eyes:      Extraocular Movements: Extraocular movements intact  Neck:      Musculoskeletal: Neck supple  Cardiovascular:      Rate and Rhythm: Normal rate  Pulmonary:      Effort: No respiratory distress  Musculoskeletal: Normal range of motion  Skin:     General: Skin is warm and dry     Psychiatric:         Mood and Affect: Mood normal

## 2021-01-26 NOTE — ASSESSMENT & PLAN NOTE
Resolving  Refill on meds provided at pt's request   Side effects on meds discussed  She is aware and agreeable to not drive while on Robaxin

## 2021-02-03 DIAGNOSIS — Z11.52 ENCOUNTER FOR SCREENING FOR COVID-19: ICD-10-CM

## 2021-02-03 PROCEDURE — U0005 INFEC AGEN DETEC AMPLI PROBE: HCPCS | Performed by: FAMILY MEDICINE

## 2021-02-03 PROCEDURE — U0003 INFECTIOUS AGENT DETECTION BY NUCLEIC ACID (DNA OR RNA); SEVERE ACUTE RESPIRATORY SYNDROME CORONAVIRUS 2 (SARS-COV-2) (CORONAVIRUS DISEASE [COVID-19]), AMPLIFIED PROBE TECHNIQUE, MAKING USE OF HIGH THROUGHPUT TECHNOLOGIES AS DESCRIBED BY CMS-2020-01-R: HCPCS | Performed by: FAMILY MEDICINE

## 2021-02-04 ENCOUNTER — TELEPHONE (OUTPATIENT)
Dept: FAMILY MEDICINE CLINIC | Facility: CLINIC | Age: 61
End: 2021-02-04

## 2021-02-04 ENCOUNTER — TRANSCRIBE ORDERS (OUTPATIENT)
Dept: FAMILY MEDICINE CLINIC | Facility: CLINIC | Age: 61
End: 2021-02-04

## 2021-02-04 DIAGNOSIS — F43.20 ADJUSTMENT DISORDER, UNSPECIFIED TYPE: Primary | ICD-10-CM

## 2021-02-04 LAB — SARS-COV-2 RNA RESP QL NAA+PROBE: NEGATIVE

## 2021-02-05 ENCOUNTER — ANESTHESIA (OUTPATIENT)
Dept: GASTROENTEROLOGY | Facility: AMBULARY SURGERY CENTER | Age: 61
End: 2021-02-05

## 2021-02-05 ENCOUNTER — HOSPITAL ENCOUNTER (OUTPATIENT)
Dept: GASTROENTEROLOGY | Facility: AMBULARY SURGERY CENTER | Age: 61
Setting detail: OUTPATIENT SURGERY
Discharge: HOME | End: 2021-02-05
Attending: COLON & RECTAL SURGERY
Payer: COMMERCIAL

## 2021-02-05 VITALS
SYSTOLIC BLOOD PRESSURE: 88 MMHG | RESPIRATION RATE: 20 BRPM | BODY MASS INDEX: 27 KG/M2 | TEMPERATURE: 96.6 F | HEIGHT: 66 IN | OXYGEN SATURATION: 98 % | WEIGHT: 168 LBS | DIASTOLIC BLOOD PRESSURE: 57 MMHG | HEART RATE: 77 BPM

## 2021-02-05 VITALS — HEART RATE: 66 BPM

## 2021-02-05 DIAGNOSIS — Z12.11 COLON CANCER SCREENING: ICD-10-CM

## 2021-02-05 PROCEDURE — 99203 OFFICE O/P NEW LOW 30 MIN: CPT | Performed by: COLON & RECTAL SURGERY

## 2021-02-05 PROCEDURE — 45378 DIAGNOSTIC COLONOSCOPY: CPT | Performed by: COLON & RECTAL SURGERY

## 2021-02-05 RX ORDER — SODIUM CHLORIDE, SODIUM LACTATE, POTASSIUM CHLORIDE, CALCIUM CHLORIDE 600; 310; 30; 20 MG/100ML; MG/100ML; MG/100ML; MG/100ML
125 INJECTION, SOLUTION INTRAVENOUS CONTINUOUS
Status: DISCONTINUED | OUTPATIENT
Start: 2021-02-05 | End: 2021-02-09 | Stop reason: HOSPADM

## 2021-02-05 RX ORDER — SODIUM CHLORIDE, SODIUM LACTATE, POTASSIUM CHLORIDE, CALCIUM CHLORIDE 600; 310; 30; 20 MG/100ML; MG/100ML; MG/100ML; MG/100ML
INJECTION, SOLUTION INTRAVENOUS CONTINUOUS PRN
Status: DISCONTINUED | OUTPATIENT
Start: 2021-02-05 | End: 2021-02-05

## 2021-02-05 RX ORDER — PROPOFOL 10 MG/ML
INJECTION, EMULSION INTRAVENOUS AS NEEDED
Status: DISCONTINUED | OUTPATIENT
Start: 2021-02-05 | End: 2021-02-05

## 2021-02-05 RX ORDER — LIDOCAINE HYDROCHLORIDE 10 MG/ML
INJECTION, SOLUTION EPIDURAL; INFILTRATION; INTRACAUDAL; PERINEURAL AS NEEDED
Status: DISCONTINUED | OUTPATIENT
Start: 2021-02-05 | End: 2021-02-05

## 2021-02-05 RX ADMIN — PROPOFOL 50 MG: 10 INJECTION, EMULSION INTRAVENOUS at 10:21

## 2021-02-05 RX ADMIN — SODIUM CHLORIDE, SODIUM LACTATE, POTASSIUM CHLORIDE, AND CALCIUM CHLORIDE: .6; .31; .03; .02 INJECTION, SOLUTION INTRAVENOUS at 10:11

## 2021-02-05 RX ADMIN — LIDOCAINE HYDROCHLORIDE 50 MG: 10 INJECTION, SOLUTION EPIDURAL; INFILTRATION; INTRACAUDAL at 10:05

## 2021-02-05 RX ADMIN — PHENYLEPHRINE HYDROCHLORIDE 200 MCG: 10 INJECTION INTRAVENOUS at 10:10

## 2021-02-05 RX ADMIN — SODIUM CHLORIDE, SODIUM LACTATE, POTASSIUM CHLORIDE, AND CALCIUM CHLORIDE: .6; .31; .03; .02 INJECTION, SOLUTION INTRAVENOUS at 09:20

## 2021-02-05 RX ADMIN — PROPOFOL 100 MG: 10 INJECTION, EMULSION INTRAVENOUS at 10:05

## 2021-02-05 RX ADMIN — PROPOFOL 50 MG: 10 INJECTION, EMULSION INTRAVENOUS at 10:12

## 2021-02-05 RX ADMIN — PROPOFOL 50 MG: 10 INJECTION, EMULSION INTRAVENOUS at 10:17

## 2021-02-05 NOTE — ANESTHESIA POSTPROCEDURE EVALUATION
Post-Op Assessment Note    CV Status:  Stable  Pain Score: 0    Pain management: adequate     Mental Status:  Alert and awake   Hydration Status:  Euvolemic   PONV Controlled:  Controlled   Airway Patency:  Patent      Post Op Vitals Reviewed: Yes      Staff: Anesthesiologist, CRNA   Comments: vss        No complications documented      BP      Temp     Pulse     Resp      SpO2

## 2021-02-05 NOTE — ANESTHESIA PREPROCEDURE EVALUATION
Procedure:  COLONOSCOPY    Relevant Problems   CARDIO   (+) Chest pain, atypical      MUSCULOSKELETAL   (+) Acute right-sided low back pain with sciatica   (+) Osteoarthritis of multiple joints      Other   (+) Chronic gastritis without bleeding   (+) Tobacco dependence        Physical Exam    Airway    Mallampati score: II  TM Distance: >3 FB  Neck ROM: full     Dental       Cardiovascular      Pulmonary      Other Findings        Anesthesia Plan  ASA Score- 2     Anesthesia Type- IV sedation with anesthesia with ASA Monitors  Additional Monitors:   Airway Plan:           Plan Factors-Exercise tolerance (METS): >4 METS  Chart reviewed  Patient summary reviewed  Patient is a current smoker  Patient instructed to abstain from smoking on day of procedure  Patient did not smoke on day of surgery  Induction- intravenous  Postoperative Plan-     Informed Consent- Anesthetic plan and risks discussed with patient  I personally reviewed this patient with the CRNA  Discussed and agreed on the Anesthesia Plan with the CRNA  Daphne Parikh

## 2021-02-09 ENCOUNTER — PATIENT OUTREACH (OUTPATIENT)
Dept: FAMILY MEDICINE CLINIC | Facility: CLINIC | Age: 61
End: 2021-02-09

## 2021-02-09 NOTE — PROGRESS NOTES
Received request to contact patient to provide support regarding outpatient mental health treatment  Call to patient via [address]   Patient reports that she is interested in becoming involved with outpatient mental health treatment  She reports that she is feeling good today but states that she is having ongoing relationship problems with her boyfriend with whom she resides  Patient confirms that she will need a Citizen [place] speaking counselor  Reviewed two local [place] speaking counseling options,  [facility] and contact information provided  Patient reports she will call to schedule an appointment  Supportive counseling provided to patient who denies further needs at this time  Patient will contact [facility] for further support as needed and contact information provided  Will continue to be available to provide support

## 2021-03-28 ENCOUNTER — IMMUNIZATIONS (OUTPATIENT)
Dept: FAMILY MEDICINE CLINIC | Facility: HOSPITAL | Age: 61
End: 2021-03-28

## 2021-03-28 DIAGNOSIS — Z23 ENCOUNTER FOR IMMUNIZATION: Primary | ICD-10-CM

## 2021-03-28 PROCEDURE — 91300 SARS-COV-2 / COVID-19 MRNA VACCINE (PFIZER-BIONTECH) 30 MCG: CPT

## 2021-03-28 PROCEDURE — 0001A SARS-COV-2 / COVID-19 MRNA VACCINE (PFIZER-BIONTECH) 30 MCG: CPT

## 2021-04-01 DIAGNOSIS — Z12.31 VISIT FOR SCREENING MAMMOGRAM: Primary | ICD-10-CM

## 2021-04-18 ENCOUNTER — IMMUNIZATIONS (OUTPATIENT)
Dept: FAMILY MEDICINE CLINIC | Facility: HOSPITAL | Age: 61
End: 2021-04-18

## 2021-04-18 DIAGNOSIS — Z23 ENCOUNTER FOR IMMUNIZATION: Primary | ICD-10-CM

## 2021-04-18 PROCEDURE — 91300 SARS-COV-2 / COVID-19 MRNA VACCINE (PFIZER-BIONTECH) 30 MCG: CPT

## 2021-04-18 PROCEDURE — 0002A SARS-COV-2 / COVID-19 MRNA VACCINE (PFIZER-BIONTECH) 30 MCG: CPT

## 2021-04-20 ENCOUNTER — HOSPITAL ENCOUNTER (OUTPATIENT)
Dept: RADIOLOGY | Age: 61
Discharge: HOME | End: 2021-04-20
Payer: COMMERCIAL

## 2021-04-20 VITALS — HEIGHT: 66 IN | WEIGHT: 172 LBS | BODY MASS INDEX: 27.64 KG/M2

## 2021-04-20 DIAGNOSIS — Z12.31 VISIT FOR SCREENING MAMMOGRAM: ICD-10-CM

## 2021-04-20 PROCEDURE — 77063 BREAST TOMOSYNTHESIS BI: CPT

## 2021-04-20 PROCEDURE — 77067 SCR MAMMO BI INCL CAD: CPT

## 2021-04-21 ENCOUNTER — TELEPHONE (OUTPATIENT)
Dept: FAMILY MEDICINE CLINIC | Facility: CLINIC | Age: 61
End: 2021-04-21

## 2021-04-27 ENCOUNTER — OFFICE VISIT (OUTPATIENT)
Dept: FAMILY MEDICINE CLINIC | Facility: CLINIC | Age: 61
End: 2021-04-27

## 2021-04-27 VITALS
WEIGHT: 168 LBS | SYSTOLIC BLOOD PRESSURE: 112 MMHG | HEIGHT: 66 IN | TEMPERATURE: 99.6 F | BODY MASS INDEX: 27 KG/M2 | DIASTOLIC BLOOD PRESSURE: 80 MMHG | OXYGEN SATURATION: 99 % | RESPIRATION RATE: 18 BRPM | HEART RATE: 96 BPM

## 2021-04-27 DIAGNOSIS — Z13.6 SCREENING FOR CARDIOVASCULAR CONDITION: ICD-10-CM

## 2021-04-27 DIAGNOSIS — R53.83 TIREDNESS: ICD-10-CM

## 2021-04-27 DIAGNOSIS — Z12.2 ENCOUNTER FOR SCREENING FOR LUNG CANCER: ICD-10-CM

## 2021-04-27 DIAGNOSIS — K29.50 OTHER CHRONIC GASTRITIS WITHOUT HEMORRHAGE: Primary | ICD-10-CM

## 2021-04-27 PROCEDURE — 99213 OFFICE O/P EST LOW 20 MIN: CPT | Performed by: FAMILY MEDICINE

## 2021-04-27 PROCEDURE — 4004F PT TOBACCO SCREEN RCVD TLK: CPT | Performed by: FAMILY MEDICINE

## 2021-04-27 PROCEDURE — 3008F BODY MASS INDEX DOCD: CPT | Performed by: FAMILY MEDICINE

## 2021-04-27 NOTE — PROGRESS NOTES
Assessment/Plan:       Problem List Items Addressed This Visit        Digestive    Chronic gastritis without bleeding - Primary     Occasional flares   Patient has identified and tries to avoid triggers   Continue H2 blockers   Will refer to GI in the setting of age and smoking hx for possible EGD at pt's request         Relevant Orders    Ambulatory referral to Gastroenterology       Other    Screening for cardiovascular condition    Relevant Orders    Lipid panel    CBC and differential    HEMOGLOBIN A1C W/ EAG ESTIMATION    Tiredness    Relevant Orders    TSH, 3rd generation with Free T4 reflex            Subjective:      Patient ID: Hector Hernandez is a 61 y o  female  HPI  Pt here for f/u  Reports some improvement of Gastritis with Pepcid  She does reports worsening reflux in the past 2 weeks  She has noted worsening with peanut butter, pizza, green beans and steak  She reports her last endoscopy was about 12 years ago in [address]  Denies hematemesis, melena  Does smoke 5-10 cigs/day  Pt has been smoking since age 25  Did have a few years in between when she did quit smoking  Did have intentional weight loss of 10 lbs in the past few weeks with diet and exercise  Pt had a colonoscopy in 02/05/21: Normal study, will need repeat in 10 years  Also reports she is fully vaccinated with [employer]  She is concerned because she had 2 episodes of "punzadas" in the chest on the 2 separate days  These has since resolved  Denies CP, SOB, palpitations  Pt reassured  The following portions of the patient's history were reviewed and updated as appropriate: She  has no past medical history on file    She   Patient Active Problem List    Diagnosis Date Noted    Tiredness 04/27/2021    Adjustment disorder 01/26/2021    Costochondritis, acute 12/22/2020    Adjustment reaction, depressive, brief 09/03/2020    Postmenopausal state 08/20/2020    Tobacco dependence 02/24/2020    Acute right-sided low back pain with sciatica 2020    Need for hepatitis C screening test 2020    Chest pain, atypical 2020    Painful swelling of joint 2019    Vertigo 2019    Chronic gastritis without bleeding 2019    Osteoarthritis of multiple joints 2019    Screening for cardiovascular condition 2019     She  has a past surgical history that includes  section and Colonoscopy  Her family history includes No Known Problems in her daughter, daughter, daughter, maternal aunt, maternal aunt, maternal aunt, maternal aunt, maternal aunt, maternal grandfather, maternal grandmother, mother, paternal aunt, paternal aunt, paternal grandfather, paternal grandmother, sister, sister, and sister; Prostate cancer (age of onset: [de-identified]) in her father  She  reports that she has been smoking  She has been smoking about 0 50 packs per day  She has never used smokeless tobacco  She reports that she does not drink alcohol or use drugs  Current Outpatient Medications   Medication Sig Dispense Refill    diclofenac sodium (VOLTAREN) 1 % Apply 2 g topically 4 (four) times a day 1 Tube 2    famotidine (PEPCID) 20 mg tablet Take 1 tablet (20 mg total) by mouth 2 (two) times a day 180 tablet 1    nicotine (NICODERM CQ) 7 mg/24hr TD 24 hr patch Place 1 patch on the skin every 24 hours 28 patch 2     No current facility-administered medications for this visit        Current Outpatient Medications on File Prior to Visit   Medication Sig    diclofenac sodium (VOLTAREN) 1 % Apply 2 g topically 4 (four) times a day    famotidine (PEPCID) 20 mg tablet Take 1 tablet (20 mg total) by mouth 2 (two) times a day    nicotine (NICODERM CQ) 7 mg/24hr TD 24 hr patch Place 1 patch on the skin every 24 hours    [DISCONTINUED] meclizine (ANTIVERT) 25 mg tablet Take 1 tablet (25 mg total) by mouth daily    [DISCONTINUED] naproxen (NAPROSYN) 500 mg tablet Take 1 tablet (500 mg total) by mouth 2 (two) times a day with meals     No current facility-administered medications on file prior to visit  She is allergic to penicillins       Review of Systems   Constitutional: Negative for chills and fever  HENT: Negative for congestion  Respiratory: Negative for cough and shortness of breath  Gastrointestinal: Negative for nausea and vomiting  Objective:      /80 (BP Location: Left arm, Patient Position: Sitting, Cuff Size: Standard)   Pulse 96   Temp 99 6 °F (37 6 °C) (Temporal)   Resp 18   Ht 5' 6" (1 676 m)   Wt 76 2 kg (168 lb)   SpO2 99%   BMI 27 12 kg/m²          Physical Exam  Constitutional:       Comments: Overweight   HENT:      Head: Normocephalic and atraumatic  Eyes:      Extraocular Movements: Extraocular movements intact  Cardiovascular:      Rate and Rhythm: Normal rate  Pulmonary:      Effort: No respiratory distress  Musculoskeletal: Normal range of motion  Neurological:      General: No focal deficit present     Psychiatric:         Mood and Affect: Mood normal

## 2021-04-27 NOTE — ASSESSMENT & PLAN NOTE
Occasional flares   Patient has identified and tries to avoid triggers   Continue H2 blockers   Will refer to GI in the setting of age and smoking hx for possible EGD at pt's request

## 2021-05-10 ENCOUNTER — HOSPITAL ENCOUNTER (OUTPATIENT)
Dept: RADIOLOGY | Age: 61
Discharge: HOME | End: 2021-05-10
Payer: COMMERCIAL

## 2021-05-10 DIAGNOSIS — Z12.2 ENCOUNTER FOR SCREENING FOR LUNG CANCER: ICD-10-CM

## 2021-05-10 PROCEDURE — 71271 CT THORAX LUNG CANCER SCR C-: CPT

## 2021-05-22 LAB — HBA1C MFR BLD HPLC: 5.4 %

## 2021-05-23 LAB
BASOPHILS # BLD AUTO: 38 CELLS/UL (ref 0–200)
BASOPHILS NFR BLD AUTO: 0.7 %
CHOLEST SERPL-MCNC: 142 MG/DL
CHOLEST/HDLC SERPL: 3.4 (CALC)
EOSINOPHIL # BLD AUTO: 119 CELLS/UL (ref 15–500)
EOSINOPHIL NFR BLD AUTO: 2.2 %
ERYTHROCYTE [DISTWIDTH] IN BLOOD BY AUTOMATED COUNT: 12.5 % (ref 11–15)
EST. AVERAGE GLUCOSE BLD GHB EST-MCNC: 108 (CALC)
EST. AVERAGE GLUCOSE BLD GHB EST-SCNC: 6 (CALC)
HBA1C MFR BLD: 5.4 % OF TOTAL HGB
HCT VFR BLD AUTO: 39.8 % (ref 35–45)
HDLC SERPL-MCNC: 42 MG/DL
HGB BLD-MCNC: 13.1 G/DL (ref 11.7–15.5)
LDLC SERPL CALC-MCNC: 82 MG/DL (CALC)
LYMPHOCYTES # BLD AUTO: 2047 CELLS/UL (ref 850–3900)
LYMPHOCYTES NFR BLD AUTO: 37.9 %
MCH RBC QN AUTO: 29.8 PG (ref 27–33)
MCHC RBC AUTO-ENTMCNC: 32.9 G/DL (ref 32–36)
MCV RBC AUTO: 90.7 FL (ref 80–100)
MONOCYTES # BLD AUTO: 400 CELLS/UL (ref 200–950)
MONOCYTES NFR BLD AUTO: 7.4 %
NEUTROPHILS # BLD AUTO: 2797 CELLS/UL (ref 1500–7800)
NEUTROPHILS NFR BLD AUTO: 51.8 %
NONHDLC SERPL-MCNC: 100 MG/DL (CALC)
PLATELET # BLD AUTO: 219 THOUSAND/UL (ref 140–400)
PMV BLD REES-ECKER: 11.1 FL (ref 7.5–12.5)
RBC # BLD AUTO: 4.39 MILLION/UL (ref 3.8–5.1)
TRIGL SERPL-MCNC: 86 MG/DL
TSH SERPL-ACNC: 1.64 MIU/L (ref 0.4–4.5)
WBC # BLD AUTO: 5.4 THOUSAND/UL (ref 3.8–10.8)

## 2021-05-24 ENCOUNTER — TELEPHONE (OUTPATIENT)
Dept: FAMILY MEDICINE CLINIC | Facility: CLINIC | Age: 61
End: 2021-05-24

## 2021-05-25 ENCOUNTER — OFFICE VISIT (OUTPATIENT)
Dept: GASTROENTEROLOGY | Facility: CLINIC | Age: 61
End: 2021-05-25
Payer: COMMERCIAL

## 2021-05-25 VITALS
DIASTOLIC BLOOD PRESSURE: 70 MMHG | TEMPERATURE: 97.5 F | HEART RATE: 72 BPM | BODY MASS INDEX: 26.1 KG/M2 | WEIGHT: 162.4 LBS | SYSTOLIC BLOOD PRESSURE: 98 MMHG | HEIGHT: 66 IN

## 2021-05-25 DIAGNOSIS — K29.50 OTHER CHRONIC GASTRITIS WITHOUT HEMORRHAGE: ICD-10-CM

## 2021-05-25 DIAGNOSIS — K21.9 GASTROESOPHAGEAL REFLUX DISEASE WITHOUT ESOPHAGITIS: Primary | ICD-10-CM

## 2021-05-25 PROCEDURE — 4004F PT TOBACCO SCREEN RCVD TLK: CPT | Performed by: INTERNAL MEDICINE

## 2021-05-25 PROCEDURE — 99244 OFF/OP CNSLTJ NEW/EST MOD 40: CPT | Performed by: INTERNAL MEDICINE

## 2021-05-25 RX ORDER — PANTOPRAZOLE SODIUM 40 MG/1
40 TABLET, DELAYED RELEASE ORAL DAILY
Qty: 30 TABLET | Refills: 3 | Status: SHIPPED | OUTPATIENT
Start: 2021-05-25 | End: 2022-01-24 | Stop reason: SDUPTHER

## 2021-05-25 NOTE — PATIENT INSTRUCTIONS
EGD scheduled on 6/10/2021 with Dr Cisse at [facility] gave pt verbal instructions/paper work given

## 2021-05-25 NOTE — H&P (VIEW-ONLY)
[facility] - Outpatient Consultation  Clarence Dunaway 61 y o  female MRN: [id number]  Encounter: [id number]          ASSESSMENT AND PLAN:      1  Gastroesophageal reflux disease without esophagitis  2  Chronic gastritis without hemorrhage    She has chronic reflux symptoms and personal history of peptic ulcer disease several years ago  She does not take NSAIDs  Her symptoms are consistent with gastroesophageal reflux disease but nature rule out underlying peptic ulcer disease, gastritis and H pylori infection  I will start her on pantoprazole 40 mg daily  Continue famotidine for breakthrough symptoms  Schedule her for EGD for further evaluation and biopsy for H pylori  We reviewed reflux precaution and handout provided in Estonian  Follow-up in 3 months        ______________________________________________________________________    HPI:  61year old female here for evaluation of epigastric pain and reflux symptoms  She has chronic reflux symptoms  Her symptoms have been worsening over the last 4 months  Her symptoms include regurgitation and heartburn  She also reports sour taste in her mouth  she has no dysphagia  She reports remote history of peptic ulcer disease diagnosed in [facility]   She is currently on famotidine with partial control of her symptoms  She has no dysphagia, weight loss, nausea, vomiting or diarrhea  She had normal colonoscopy in December 2020 by Dr Wyatt Redmond:    CONSTITUTIONAL: Denies any fever, chills, rigors, and weight loss  HEENT: No earache or tinnitus  Denies hearing loss or visual disturbances  CARDIOVASCULAR: No chest pain or palpitations  RESPIRATORY: Denies any cough, hemoptysis, shortness of breath or dyspnea on exertion  GASTROINTESTINAL: As noted in the History of Present Illness  GENITOURINARY: No problems with urination  Denies any hematuria or dysuria    NEUROLOGIC: No dizziness or vertigo, denies headaches  MUSCULOSKELETAL: Denies any muscle or joint pain  SKIN: Denies skin rashes or itching  ENDOCRINE: Denies excessive thirst  Denies intolerance to heat or cold  PSYCHOSOCIAL: Denies depression or anxiety  Denies any recent memory loss  Historical Information   History reviewed  No pertinent past medical history    Past Surgical History:   Procedure Laterality Date     SECTION      3 times    COLONOSCOPY      UPPER GASTROINTESTINAL ENDOSCOPY       Social History   Social History     Substance and Sexual Activity   Alcohol Use Never    Frequency: Never     Social History     Substance and Sexual Activity   Drug Use Never     Social History     Tobacco Use   Smoking Status Current Every Day Smoker    Packs/day: 0 50   Smokeless Tobacco Never Used     Family History   Problem Relation Age of Onset    Prostate cancer Father [de-identified]    No Known Problems Mother     No Known Problems Sister     No Known Problems Daughter     No Known Problems Maternal Grandmother     No Known Problems Maternal Grandfather     No Known Problems Paternal Grandmother     No Known Problems Paternal Grandfather     No Known Problems Sister     No Known Problems Sister     No Known Problems Daughter     No Known Problems Daughter     No Known Problems Maternal Aunt     No Known Problems Maternal Aunt     No Known Problems Maternal Aunt     No Known Problems Maternal Aunt     No Known Problems Maternal Aunt     No Known Problems Paternal Aunt     No Known Problems Paternal Aunt        Meds/Allergies       Current Outpatient Medications:     diclofenac sodium (VOLTAREN) 1 %    famotidine (PEPCID) 20 mg tablet    nicotine (NICODERM CQ) 7 mg/24hr TD 24 hr patch    Allergies   Allergen Reactions    Penicillins Rash     Will get urine infections           Objective     Blood pressure 98/70, pulse 72, temperature 97 5 °F (36 4 °C), temperature source Tympanic, height 5' 6" (1 676 m), weight 73 7 kg (162 lb 6 4 oz)  Body mass index is 26 21 kg/m²  PHYSICAL EXAM:      General Appearance:   Alert, cooperative, no distress   HEENT:   Normocephalic, atraumatic, anicteric      Neck:  Supple, symmetrical, trachea midline   Lungs:   Clear to auscultation bilaterally; no rales, rhonchi or wheezing; respirations unlabored    Heart[de-identified]   Regular rate and rhythm; no murmur, rub, or gallop  Abdomen:   Soft, non-tender, non-distended; normal bowel sounds; no masses, no organomegaly    Genitalia:   Deferred    Rectal:   Deferred    Extremities:  No cyanosis, clubbing or edema    Pulses:  2+ and symmetric    Skin:  No jaundice, rashes, or lesions    Lymph nodes:  No palpable cervical lymphadenopathy        Lab Results:   No visits with results within 1 Day(s) from this visit     Latest known visit with results is:   Orders Only on 05/22/2021   Component Date Value    Total Cholesterol 05/22/2021 142     HDL 05/22/2021 42*    Triglycerides 05/22/2021 86     LDL Calculated 05/22/2021 82     Chol HDLC Ratio 05/22/2021 3 4     Non-HDL Cholesterol 05/22/2021 100     White Blood Cell Count 05/22/2021 5 4     Red Blood Cell Count 05/22/2021 4 39     Hemoglobin 05/22/2021 13 1     HCT 05/22/2021 39 8     MCV 05/22/2021 90 7     MCH 05/22/2021 29 8     MCHC 05/22/2021 32 9     RDW 05/22/2021 12 5     Platelet Count 22/73/7066 219     SL AMB MPV 05/22/2021 11 1     Neutrophils (Absolute) 05/22/2021 2,797     Lymphocytes (Absolute) 05/22/2021 2,047     Monocytes (Absolute) 05/22/2021 400     Eosinophils (Absolute) 05/22/2021 119     Basophils ABS 05/22/2021 38     Neutrophils 05/22/2021 51 8     Lymphocytes 05/22/2021 37 9     Monocytes 05/22/2021 7 4     Eosinophils 05/22/2021 2 2     Basophils PCT 05/22/2021 0 7     TSH W/RFX TO FREE T4 05/22/2021 1 64     Hemoglobin A1C 05/22/2021 5 4     Estimated Average Glucose 05/22/2021 108     Estimated Average Glucos* 05/22/2021 6 0          Radiology Results:   Ct Lung Screening Program    Result Date: 5/10/2021  Narrative: CT CHEST LUNG CANCER SCREENING WITHOUT IV CONTRAST INDICATION:   Z12 2: Encounter for screening for malignant neoplasm of respiratory organs  Current smoker  COMPARISON: CT scan 2/20/2020 TECHNIQUE:  Unenhanced CT examination of the chest was performed utilizing a low dose protocol  Reformatted images were created in axial, sagittal, and coronal planes  Radiation dose length product (DLP) for this visit:  106 mGy-cm   This examination, like all CT scans performed in the [facility], was performed utilizing techniques to minimize radiation dose exposure, including the use of iterative reconstruction and automated exposure control  FINDINGS: LUNGS:  Mild bibasilar groundglass opacity likely atelectasis  Lungs are otherwise clear  There is no tracheal or endobronchial lesion  PLEURA:  Unremarkable  HEART/GREAT VESSELS:  Unremarkable for patient's age  MEDIASTINUM AND JAVIER:  Unremarkable  CHEST WALL AND LOWER NECK:   Unremarkable  VISUALIZED STRUCTURES IN THE UPPER ABDOMEN:  Unremarkable  OSSEOUS STRUCTURES:  No acute fracture or destructive osseous lesion  Impression: No nodules and/or definitely benign nodules  Lung-RADS1, negative  Continued annual screening with LDCT in 12 months   Workstation performed: [id number]

## 2021-05-25 NOTE — PROGRESS NOTES
[facility] - Outpatient Consultation  Juan Luis Delcid 61 y o  female MRN: [id number]  Encounter: [id number]          ASSESSMENT AND PLAN:      1  Gastroesophageal reflux disease without esophagitis  2  Chronic gastritis without hemorrhage    She has chronic reflux symptoms and personal history of peptic ulcer disease several years ago  She does not take NSAIDs  Her symptoms are consistent with gastroesophageal reflux disease but nature rule out underlying peptic ulcer disease, gastritis and H pylori infection  I will start her on pantoprazole 40 mg daily  Continue famotidine for breakthrough symptoms  Schedule her for EGD for further evaluation and biopsy for H pylori  We reviewed reflux precaution and handout provided in [place]  Follow-up in 3 months        ______________________________________________________________________    HPI:  61year old female here for evaluation of epigastric pain and reflux symptoms  She has chronic reflux symptoms  Her symptoms have been worsening over the last 4 months  Her symptoms include regurgitation and heartburn  She also reports sour taste in her mouth  she has no dysphagia  She reports remote history of peptic ulcer disease diagnosed in [facility] [[email or web address]]   She is currently on famotidine with partial control of her symptoms  She has no dysphagia, weight loss, nausea, vomiting or diarrhea  She had normal colonoscopy in December 2020 by Dr Ric Willard:    CONSTITUTIONAL: Denies any fever, chills, rigors, and weight loss  HEENT: No earache or tinnitus  Denies hearing loss or visual disturbances  CARDIOVASCULAR: No chest pain or palpitations  RESPIRATORY: Denies any cough, hemoptysis, shortness of breath or dyspnea on exertion  GASTROINTESTINAL: As noted in the History of Present Illness  GENITOURINARY: No problems with urination  Denies any hematuria or dysuria    NEUROLOGIC: No dizziness or vertigo, denies headaches  MUSCULOSKELETAL: Denies any muscle or joint pain  SKIN: Denies skin rashes or itching  ENDOCRINE: Denies excessive thirst  Denies intolerance to heat or cold  PSYCHOSOCIAL: Denies depression or anxiety  Denies any recent memory loss  Historical Information   History reviewed  No pertinent past medical history    Past Surgical History:   Procedure Laterality Date     SECTION      3 times    COLONOSCOPY      UPPER GASTROINTESTINAL ENDOSCOPY       Social History   Social History     Substance and Sexual Activity   Alcohol Use Never    Frequency: Never     Social History     Substance and Sexual Activity   Drug Use Never     Social History     Tobacco Use   Smoking Status Current Every Day Smoker    Packs/day: 0 50   Smokeless Tobacco Never Used     Family History   Problem Relation Age of Onset    Prostate cancer Father [de-identified]    No Known Problems Mother     No Known Problems Sister     No Known Problems Daughter     No Known Problems Maternal Grandmother     No Known Problems Maternal Grandfather     No Known Problems Paternal Grandmother     No Known Problems Paternal Grandfather     No Known Problems Sister     No Known Problems Sister     No Known Problems Daughter     No Known Problems Daughter     No Known Problems Maternal Aunt     No Known Problems Maternal Aunt     No Known Problems Maternal Aunt     No Known Problems Maternal Aunt     No Known Problems Maternal Aunt     No Known Problems Paternal Aunt     No Known Problems Paternal Aunt        Meds/Allergies       Current Outpatient Medications:     diclofenac sodium (VOLTAREN) 1 %    famotidine (PEPCID) 20 mg tablet    nicotine (NICODERM CQ) 7 mg/24hr TD 24 hr patch    Allergies   Allergen Reactions    Penicillins Rash     Will get urine infections           Objective     Blood pressure 98/70, pulse 72, temperature 97 5 °F (36 4 °C), temperature source Tympanic, height 5' 6" (1 676 m), weight 73 7 kg (162 lb 6 4 oz)  Body mass index is 26 21 kg/m²  PHYSICAL EXAM:      General Appearance:   Alert, cooperative, no distress   HEENT:   Normocephalic, atraumatic, anicteric      Neck:  Supple, symmetrical, trachea midline   Lungs:   Clear to auscultation bilaterally; no rales, rhonchi or wheezing; respirations unlabored    Heart[de-identified]   Regular rate and rhythm; no murmur, rub, or gallop  Abdomen:   Soft, non-tender, non-distended; normal bowel sounds; no masses, no organomegaly    Genitalia:   Deferred    Rectal:   Deferred    Extremities:  No cyanosis, clubbing or edema    Pulses:  2+ and symmetric    Skin:  No jaundice, rashes, or lesions    Lymph nodes:  No palpable cervical lymphadenopathy        Lab Results:   No visits with results within 1 Day(s) from this visit     Latest known visit with results is:   Orders Only on 05/22/2021   Component Date Value    Total Cholesterol 05/22/2021 142     HDL 05/22/2021 42*    Triglycerides 05/22/2021 86     LDL Calculated 05/22/2021 82     Chol HDLC Ratio 05/22/2021 3 4     Non-HDL Cholesterol 05/22/2021 100     White Blood Cell Count 05/22/2021 5 4     Red Blood Cell Count 05/22/2021 4 39     Hemoglobin 05/22/2021 13 1     HCT 05/22/2021 39 8     MCV 05/22/2021 90 7     MCH 05/22/2021 29 8     MCHC 05/22/2021 32 9     RDW 05/22/2021 12 5     Platelet Count 57/28/9490 219     SL AMB MPV 05/22/2021 11 1     Neutrophils (Absolute) 05/22/2021 2,797     Lymphocytes (Absolute) 05/22/2021 2,047     Monocytes (Absolute) 05/22/2021 400     Eosinophils (Absolute) 05/22/2021 119     Basophils ABS 05/22/2021 38     Neutrophils 05/22/2021 51 8     Lymphocytes 05/22/2021 37 9     Monocytes 05/22/2021 7 4     Eosinophils 05/22/2021 2 2     Basophils PCT 05/22/2021 0 7     TSH W/RFX TO FREE T4 05/22/2021 1 64     Hemoglobin A1C 05/22/2021 5 4     Estimated Average Glucose 05/22/2021 108     Estimated Average Glucos* 05/22/2021 6 0          Radiology Results:   Ct Lung Screening Program    Result Date: 5/10/2021  Narrative: CT CHEST LUNG CANCER SCREENING WITHOUT IV CONTRAST INDICATION:   Z12 2: Encounter for screening for malignant neoplasm of respiratory organs  Current smoker  COMPARISON: CT scan 2/20/2020 TECHNIQUE:  Unenhanced CT examination of the chest was performed utilizing a low dose protocol  Reformatted images were created in axial, sagittal, and coronal planes  Radiation dose length product (DLP) for this visit:  106 mGy-cm   This examination, like all CT scans performed in the [facility], was performed utilizing techniques to minimize radiation dose exposure, including the use of iterative reconstruction and automated exposure control  FINDINGS: LUNGS:  Mild bibasilar groundglass opacity likely atelectasis  Lungs are otherwise clear  There is no tracheal or endobronchial lesion  PLEURA:  Unremarkable  HEART/GREAT VESSELS:  Unremarkable for patient's age  MEDIASTINUM AND JAVIER:  Unremarkable  CHEST WALL AND LOWER NECK:   Unremarkable  VISUALIZED STRUCTURES IN THE UPPER ABDOMEN:  Unremarkable  OSSEOUS STRUCTURES:  No acute fracture or destructive osseous lesion  Impression: No nodules and/or definitely benign nodules  Lung-RADS1, negative  Continued annual screening with LDCT in 12 months   Workstation performed: [id number]

## 2021-06-03 PROCEDURE — 99284 EMERGENCY DEPT VISIT MOD MDM: CPT

## 2021-06-04 ENCOUNTER — APPOINTMENT (EMERGENCY)
Dept: CT IMAGING | Facility: HOSPITAL | Age: 61
End: 2021-06-04
Payer: COMMERCIAL

## 2021-06-04 ENCOUNTER — HOSPITAL ENCOUNTER (EMERGENCY)
Facility: HOSPITAL | Age: 61
Discharge: HOME | End: 2021-06-04
Attending: EMERGENCY MEDICINE | Admitting: EMERGENCY MEDICINE
Payer: COMMERCIAL

## 2021-06-04 VITALS
TEMPERATURE: 98.9 F | RESPIRATION RATE: 16 BRPM | DIASTOLIC BLOOD PRESSURE: 54 MMHG | SYSTOLIC BLOOD PRESSURE: 90 MMHG | OXYGEN SATURATION: 99 % | HEART RATE: 75 BPM

## 2021-06-04 DIAGNOSIS — R30.0 DYSURIA: Primary | ICD-10-CM

## 2021-06-04 DIAGNOSIS — R10.2 SUPRAPUBIC PAIN, ACUTE: ICD-10-CM

## 2021-06-04 LAB
ALBUMIN SERPL BCP-MCNC: 3.9 G/DL (ref 3.5–5)
ALP SERPL-CCNC: 87 U/L (ref 46–116)
ALT SERPL W P-5'-P-CCNC: 23 U/L (ref 12–78)
ANION GAP SERPL CALCULATED.3IONS-SCNC: 9 MMOL/L (ref 4–13)
APTT PPP: 26 SECONDS (ref 23–37)
AST SERPL W P-5'-P-CCNC: 16 U/L (ref 5–45)
BASOPHILS # BLD AUTO: 0.06 THOUSANDS/ΜL (ref 0–0.1)
BASOPHILS NFR BLD AUTO: 1 % (ref 0–1)
BILIRUB SERPL-MCNC: 0.34 MG/DL (ref 0.2–1)
BILIRUB UR QL STRIP: NEGATIVE
BUN SERPL-MCNC: 17 MG/DL (ref 5–25)
CALCIUM SERPL-MCNC: 9.4 MG/DL (ref 8.3–10.1)
CHLORIDE SERPL-SCNC: 104 MMOL/L (ref 100–108)
CLARITY UR: CLEAR
CO2 SERPL-SCNC: 26 MMOL/L (ref 21–32)
COLOR UR: YELLOW
CREAT SERPL-MCNC: 0.64 MG/DL (ref 0.6–1.3)
EOSINOPHIL # BLD AUTO: 0.09 THOUSAND/ΜL (ref 0–0.61)
EOSINOPHIL NFR BLD AUTO: 1 % (ref 0–6)
ERYTHROCYTE [DISTWIDTH] IN BLOOD BY AUTOMATED COUNT: 13.1 % (ref 11.6–15.1)
GFR SERPL CREATININE-BSD FRML MDRD: 97 ML/MIN/1.73SQ M
GLUCOSE SERPL-MCNC: 98 MG/DL (ref 65–140)
GLUCOSE UR STRIP-MCNC: NEGATIVE MG/DL
HCT VFR BLD AUTO: 40.9 % (ref 34.8–46.1)
HGB BLD-MCNC: 13.3 G/DL (ref 11.5–15.4)
HGB UR QL STRIP.AUTO: NEGATIVE
IMM GRANULOCYTES # BLD AUTO: 0.03 THOUSAND/UL (ref 0–0.2)
IMM GRANULOCYTES NFR BLD AUTO: 0 % (ref 0–2)
INR PPP: 0.96 (ref 0.84–1.19)
KETONES UR STRIP-MCNC: NEGATIVE MG/DL
LEUKOCYTE ESTERASE UR QL STRIP: NEGATIVE
LIPASE SERPL-CCNC: 172 U/L (ref 73–393)
LYMPHOCYTES # BLD AUTO: 2.86 THOUSANDS/ΜL (ref 0.6–4.47)
LYMPHOCYTES NFR BLD AUTO: 34 % (ref 14–44)
MCH RBC QN AUTO: 29.5 PG (ref 26.8–34.3)
MCHC RBC AUTO-ENTMCNC: 32.5 G/DL (ref 31.4–37.4)
MCV RBC AUTO: 91 FL (ref 82–98)
MONOCYTES # BLD AUTO: 0.45 THOUSAND/ΜL (ref 0.17–1.22)
MONOCYTES NFR BLD AUTO: 5 % (ref 4–12)
NEUTROPHILS # BLD AUTO: 4.9 THOUSANDS/ΜL (ref 1.85–7.62)
NEUTS SEG NFR BLD AUTO: 59 % (ref 43–75)
NITRITE UR QL STRIP: NEGATIVE
NRBC BLD AUTO-RTO: 0 /100 WBCS
PH UR STRIP.AUTO: 6 [PH]
PLATELET # BLD AUTO: 243 THOUSANDS/UL (ref 149–390)
PMV BLD AUTO: 10.8 FL (ref 8.9–12.7)
POTASSIUM SERPL-SCNC: 3.7 MMOL/L (ref 3.5–5.3)
PROT SERPL-MCNC: 7.6 G/DL (ref 6.4–8.2)
PROT UR STRIP-MCNC: NEGATIVE MG/DL
PROTHROMBIN TIME: 12.9 SECONDS (ref 11.6–14.5)
RBC # BLD AUTO: 4.51 MILLION/UL (ref 3.81–5.12)
SODIUM SERPL-SCNC: 139 MMOL/L (ref 136–145)
SP GR UR STRIP.AUTO: <=1.005 (ref 1–1.03)
UROBILINOGEN UR QL STRIP.AUTO: 0.2 E.U./DL
WBC # BLD AUTO: 8.39 THOUSAND/UL (ref 4.31–10.16)

## 2021-06-04 PROCEDURE — 83690 ASSAY OF LIPASE: CPT | Performed by: PHYSICIAN ASSISTANT

## 2021-06-04 PROCEDURE — 96361 HYDRATE IV INFUSION ADD-ON: CPT

## 2021-06-04 PROCEDURE — 96374 THER/PROPH/DIAG INJ IV PUSH: CPT

## 2021-06-04 PROCEDURE — 85730 THROMBOPLASTIN TIME PARTIAL: CPT | Performed by: PHYSICIAN ASSISTANT

## 2021-06-04 PROCEDURE — 85610 PROTHROMBIN TIME: CPT | Performed by: PHYSICIAN ASSISTANT

## 2021-06-04 PROCEDURE — 80053 COMPREHEN METABOLIC PANEL: CPT | Performed by: PHYSICIAN ASSISTANT

## 2021-06-04 PROCEDURE — 36415 COLL VENOUS BLD VENIPUNCTURE: CPT | Performed by: PHYSICIAN ASSISTANT

## 2021-06-04 PROCEDURE — 81003 URINALYSIS AUTO W/O SCOPE: CPT | Performed by: PHYSICIAN ASSISTANT

## 2021-06-04 PROCEDURE — 99284 EMERGENCY DEPT VISIT MOD MDM: CPT | Performed by: PHYSICIAN ASSISTANT

## 2021-06-04 PROCEDURE — 74177 CT ABD & PELVIS W/CONTRAST: CPT

## 2021-06-04 PROCEDURE — 85025 COMPLETE CBC W/AUTO DIFF WBC: CPT | Performed by: PHYSICIAN ASSISTANT

## 2021-06-04 RX ORDER — KETOROLAC TROMETHAMINE 30 MG/ML
15 INJECTION, SOLUTION INTRAMUSCULAR; INTRAVENOUS ONCE
Status: COMPLETED | OUTPATIENT
Start: 2021-06-04 | End: 2021-06-04

## 2021-06-04 RX ORDER — CEPHALEXIN 500 MG/1
500 CAPSULE ORAL EVERY 12 HOURS SCHEDULED
Qty: 14 CAPSULE | Refills: 0 | Status: SHIPPED | OUTPATIENT
Start: 2021-06-04 | End: 2021-06-11

## 2021-06-04 RX ORDER — IBUPROFEN 400 MG/1
400 TABLET ORAL EVERY 6 HOURS PRN
Qty: 12 TABLET | Refills: 0 | Status: SHIPPED | OUTPATIENT
Start: 2021-06-04 | End: 2022-01-24 | Stop reason: ALTCHOICE

## 2021-06-04 RX ADMIN — SODIUM CHLORIDE 1000 ML: 0.9 INJECTION, SOLUTION INTRAVENOUS at 00:54

## 2021-06-04 RX ADMIN — IOHEXOL 100 ML: 350 INJECTION, SOLUTION INTRAVENOUS at 01:56

## 2021-06-04 RX ADMIN — KETOROLAC TROMETHAMINE 15 MG: 30 INJECTION, SOLUTION INTRAMUSCULAR at 00:58

## 2021-06-04 NOTE — ED NOTES
Pt up and walking to the bathroom to provide a urine sample  Pt in pain with ambulation, but otherwise walking without any other difficulties        Anastasia Mariscal RN  06/04/21 [id number]

## 2021-06-04 NOTE — DISCHARGE INSTRUCTIONS
Take motrin as indicated  Take keflex if symptoms persist or worsen  Follow up with PCP  Follow up with Emergency Department if symptoms persist or exacerbate

## 2021-06-04 NOTE — Clinical Note
Froy Moreno was seen and treated in our emergency department on 6/3/2021  Diagnosis:     Vida    She may return on this date: 06/05/2021         If you have any questions or concerns, please don't hesitate to call        Earlis Sandhoff, PA-C    ______________________________           _______________          _______________  Hospital Representative                              Date                                Time

## 2021-06-04 NOTE — ED PROVIDER NOTES
History  Chief Complaint   Patient presents with    Pelvic Pain     patient c/o of pelvic pain starting two days ago  feels like a constant pressure  feels similar to menstural cramps  states she "feels like her bladder is low"     Patient is a 72-year-old female with history of  section that presents emergency department with aching intermittent nonradiating lower abdominal pain for 1 day  Patient  is at bedside providing English to [facility] [place] translation  Patient has associated symptomatology of urinary burning and frequency beginning with current ED presentation of lower abdominal pain  Patient denies recent history of antibiotics  Patient denies vaginal bleeding, discharge, and pain  Patient denies palliative factors with provocative factors of pressure to lower abdomen  Patient denies not effective treatment  Patient denies history of kidney stones  Patient denies fevers, chills, nausea, vomiting, diarrhea, and constipation  Patient denies recent fall or recent trauma  Patient denies sick contacts and recent travel  Patient denies chest pain, and shortness of breath        History provided by:  Patient   used: No    Abdominal Pain  Pain location:  Suprapubic  Pain quality: aching    Pain radiates to:  Does not radiate  Pain severity:  Mild  Onset quality:  Gradual  Duration:  1 day  Timing:  Intermittent  Progression:  Worsening  Chronicity:  New  Context: previous surgery    Context: not diet changes, not eating, not sick contacts, not suspicious food intake and not trauma    Relieved by:  Nothing  Worsened by:  Palpation  Ineffective treatments:  None tried  Associated symptoms: dysuria    Associated symptoms: no chest pain, no chills, no constipation, no cough, no diarrhea, no fatigue, no fever, no melena, no nausea, no shortness of breath, no sore throat, no vaginal bleeding, no vaginal discharge and no vomiting        Prior to Admission Medications   Prescriptions Last Dose Informant Patient Reported? Taking?   diclofenac sodium (VOLTAREN) 1 % Past Month at Unknown time Self No Yes   Sig: Apply 2 g topically 4 (four) times a day   famotidine (PEPCID) 20 mg tablet 6/3/2021 at Unknown time Self No Yes   Sig: Take 1 tablet (20 mg total) by mouth 2 (two) times a day   nicotine (NICODERM CQ) 7 mg/24hr TD 24 hr patch Past Month at Unknown time Self No Yes   Sig: Place 1 patch on the skin every 24 hours   pantoprazole (PROTONIX) 40 mg tablet Past Month at Unknown time  No Yes   Sig: Take 1 tablet (40 mg total) by mouth daily      Facility-Administered Medications: None       History reviewed  No pertinent past medical history  Past Surgical History:   Procedure Laterality Date     SECTION      3 times    COLONOSCOPY      UPPER GASTROINTESTINAL ENDOSCOPY         Family History   Problem Relation Age of Onset    Prostate cancer Father [de-identified]    No Known Problems Mother     No Known Problems Sister     No Known Problems Daughter     No Known Problems Maternal Grandmother     No Known Problems Maternal Grandfather     No Known Problems Paternal Grandmother     No Known Problems Paternal Grandfather     No Known Problems Sister     No Known Problems Sister     No Known Problems Daughter     No Known Problems Daughter     No Known Problems Maternal Aunt     No Known Problems Maternal Aunt     No Known Problems Maternal Aunt     No Known Problems Maternal Aunt     No Known Problems Maternal Aunt     No Known Problems Paternal Aunt     No Known Problems Paternal Aunt      I have reviewed and agree with the history as documented      E-Cigarette/Vaping    E-Cigarette Use Never User      E-Cigarette/Vaping Substances    Nicotine No     THC No     CBD No     Flavoring No     Other No     Unknown No      Social History     Tobacco Use    Smoking status: Current Every Day Smoker     Packs/day: 0 50    Smokeless tobacco: Never Used   Substance Use Topics    Alcohol use: Never     Frequency: Never    Drug use: Never       Review of Systems   Constitutional: Negative for activity change, appetite change, chills, fatigue and fever  HENT: Negative for congestion, postnasal drip, rhinorrhea, sinus pressure, sinus pain, sore throat and tinnitus  Eyes: Negative for photophobia and visual disturbance  Respiratory: Negative for cough, chest tightness and shortness of breath  Cardiovascular: Negative for chest pain and palpitations  Gastrointestinal: Positive for abdominal pain  Negative for constipation, diarrhea, melena, nausea and vomiting  Genitourinary: Positive for dysuria  Negative for difficulty urinating, flank pain, frequency, urgency, vaginal bleeding and vaginal discharge  Musculoskeletal: Negative for back pain, gait problem, neck pain and neck stiffness  Skin: Negative for pallor and rash  Allergic/Immunologic: Negative for environmental allergies and food allergies  Neurological: Negative for dizziness, weakness, numbness and headaches  Psychiatric/Behavioral: Negative for confusion  All other systems reviewed and are negative  Physical Exam  Physical Exam  Vitals signs and nursing note reviewed  Constitutional:       General: She is awake  Appearance: Normal appearance  She is well-developed  She is not ill-appearing, toxic-appearing or diaphoretic  Comments: /66 (BP Location: Left arm)   Pulse 86   Temp 98 9 °F (37 2 °C) (Oral)   Resp 18   SpO2 100%      HENT:      Head: Normocephalic and atraumatic  Right Ear: Hearing and external ear normal  No decreased hearing noted  No drainage, swelling or tenderness  No mastoid tenderness  Left Ear: Hearing and external ear normal  No decreased hearing noted  No drainage, swelling or tenderness  No mastoid tenderness  Nose: Nose normal       Mouth/Throat:      Lips: Pink  Mouth: Mucous membranes are moist       Pharynx: Oropharynx is clear   Uvula midline  Eyes:      General: Lids are normal  Vision grossly intact  Right eye: No discharge  Left eye: No discharge  Extraocular Movements: Extraocular movements intact  Conjunctiva/sclera: Conjunctivae normal       Pupils: Pupils are equal, round, and reactive to light  Neck:      Musculoskeletal: Full passive range of motion without pain, normal range of motion and neck supple  Normal range of motion  No neck rigidity, spinous process tenderness or muscular tenderness  Vascular: No JVD  Trachea: Trachea and phonation normal  No tracheal tenderness or tracheal deviation  Cardiovascular:      Rate and Rhythm: Normal rate and regular rhythm  Pulses: Normal pulses  Radial pulses are 2+ on the right side and 2+ on the left side  Posterior tibial pulses are 2+ on the right side and 2+ on the left side  Heart sounds: Normal heart sounds  Pulmonary:      Effort: Pulmonary effort is normal       Breath sounds: Normal breath sounds  No stridor  No decreased breath sounds, wheezing, rhonchi or rales  Chest:      Chest wall: No tenderness  Abdominal:      General: Abdomen is flat  Bowel sounds are normal  There is no distension  Palpations: Abdomen is soft  Abdomen is not rigid  Tenderness: There is abdominal tenderness in the suprapubic area  There is no right CVA tenderness, left CVA tenderness, guarding or rebound  Comments: Patient has no overlying epidermal rashes, lesions, erythema, ecchymosis, or abrasion abdominal region  Skin intact  Musculoskeletal: Normal range of motion  Lymphadenopathy:      Head:      Right side of head: No submental, submandibular, tonsillar, preauricular, posterior auricular or occipital adenopathy  Left side of head: No submental, submandibular, tonsillar, preauricular, posterior auricular or occipital adenopathy  Cervical: No cervical adenopathy        Right cervical: No superficial, deep or posterior cervical adenopathy  Left cervical: No superficial, deep or posterior cervical adenopathy  Skin:     General: Skin is warm  Capillary Refill: Capillary refill takes less than 2 seconds  Neurological:      General: No focal deficit present  Mental Status: She is alert and oriented to person, place, and time  GCS: GCS eye subscore is 4  GCS verbal subscore is 5  GCS motor subscore is 6  Sensory: No sensory deficit  Deep Tendon Reflexes: Reflexes are normal and symmetric  Reflex Scores:       Patellar reflexes are 2+ on the right side and 2+ on the left side  Psychiatric:         Mood and Affect: Mood normal          Speech: Speech normal          Behavior: Behavior normal  Behavior is cooperative  Thought Content:  Thought content normal          Judgment: Judgment normal          Vital Signs  ED Triage Vitals   Temperature Pulse Respirations Blood Pressure SpO2   06/03/21 2314 06/03/21 2314 06/03/21 2314 06/03/21 2314 06/03/21 2314   98 9 °F (37 2 °C) 86 18 127/66 100 %      Temp Source Heart Rate Source Patient Position - Orthostatic VS BP Location FiO2 (%)   06/03/21 2314 06/03/21 2314 06/03/21 2314 06/03/21 2314 --   Oral Monitor Sitting Left arm       Pain Score       06/04/21 0058       7           Vitals:    06/03/21 2314 06/04/21 0225   BP: 127/66 90/54   Pulse: 86 75   Patient Position - Orthostatic VS: Sitting Lying         Visual Acuity      ED Medications  Medications   sodium chloride 0 9 % bolus 1,000 mL (0 mL Intravenous Stopped 6/4/21 0154)   ketorolac (TORADOL) injection 15 mg (15 mg Intravenous Given 6/4/21 0058)   iohexol (OMNIPAQUE) 350 MG/ML injection (SINGLE-DOSE) 100 mL (100 mL Intravenous Given 6/4/21 0156)       Diagnostic Studies  Results Reviewed     Procedure Component Value Units Date/Time    Comprehensive metabolic panel [[id number]] Collected: 06/04/21 0053    Lab Status: Final result Specimen: Blood from Arm, Right Updated: 06/04/21 0130     Sodium 139 mmol/L      Potassium 3 7 mmol/L      Chloride 104 mmol/L      CO2 26 mmol/L      ANION GAP 9 mmol/L      BUN 17 mg/dL      Creatinine 0 64 mg/dL      Glucose 98 mg/dL      Calcium 9 4 mg/dL      AST 16 U/L      ALT 23 U/L      Alkaline Phosphatase 87 U/L      Total Protein 7 6 g/dL      Albumin 3 9 g/dL      Total Bilirubin 0 34 mg/dL      eGFR 97 ml/min/1 73sq m     Narrative:      National Kidney Disease Foundation guidelines for Chronic Kidney Disease (CKD):     Stage 1 with normal or high GFR (GFR > 90 mL/min/1 73 square meters)    Stage 2 Mild CKD (GFR = 60-89 mL/min/1 73 square meters)    Stage 3A Moderate CKD (GFR = 45-59 mL/min/1 73 square meters)    Stage 3B Moderate CKD (GFR = 30-44 mL/min/1 73 square meters)    Stage 4 Severe CKD (GFR = 15-29 mL/min/1 73 square meters)    Stage 5 End Stage CKD (GFR <15 mL/min/1 73 square meters)  Note: GFR calculation is accurate only with a steady state creatinine    Lipase [[id number]]  (Normal) Collected: 06/04/21 0053    Lab Status: Final result Specimen: Blood from Arm, Right Updated: 06/04/21 0130     Lipase 172 u/L     Protime-INR [[id number]]  (Normal) Collected: 06/04/21 0053    Lab Status: Final result Specimen: Blood from Arm, Right Updated: 06/04/21 0125     Protime 12 9 seconds      INR 0 96    APTT [[id number]]  (Normal) Collected: 06/04/21 0053    Lab Status: Final result Specimen: Blood from Arm, Right Updated: 06/04/21 0125     PTT 26 seconds     UA w Reflex to Microscopic w Reflex to Culture [[id number]] Collected: 06/04/21 0054    Lab Status: Final result Specimen: Urine, Clean Catch Updated: 06/04/21 0123     Color, UA Yellow     Clarity, UA Clear     Specific Gravity, UA <=1 005     pH, UA 6 0     Leukocytes, UA Negative     Nitrite, UA Negative     Protein, UA Negative mg/dl      Glucose, UA Negative mg/dl      Ketones, UA Negative mg/dl      Urobilinogen, UA 0 2 E U /dl      Bilirubin, UA Negative Blood, UA Negative    CBC and differential [[id number]] Collected: 06/04/21 0053    Lab Status: Final result Specimen: Blood from Arm, Right Updated: 06/04/21 0112     WBC 8 39 Thousand/uL      RBC 4 51 Million/uL      Hemoglobin 13 3 g/dL      Hematocrit 40 9 %      MCV 91 fL      MCH 29 5 pg      MCHC 32 5 g/dL      RDW 13 1 %      MPV 10 8 fL      Platelets 535 Thousands/uL      nRBC 0 /100 WBCs      Neutrophils Relative 59 %      Immat GRANS % 0 %      Lymphocytes Relative 34 %      Monocytes Relative 5 %      Eosinophils Relative 1 %      Basophils Relative 1 %      Neutrophils Absolute 4 90 Thousands/µL      Immature Grans Absolute 0 03 Thousand/uL      Lymphocytes Absolute 2 86 Thousands/µL      Monocytes Absolute 0 45 Thousand/µL      Eosinophils Absolute 0 09 Thousand/µL      Basophils Absolute 0 06 Thousands/µL                  CT abdomen pelvis with contrast   Final Result by Danette Sanabria MD (06/04 0248)      No acute pathology visualized on CT of the abdomen and pelvis with IV without oral contrast             Workstation performed: [id number]                    Procedures  Procedures         ED Course                             SBIRT 20yo+      Most Recent Value   SBIRT (24 yo +)   In order to provide better care to our patients, we are screening all of our patients for alcohol and drug use  Would it be okay to ask you these screening questions?   Unable to answer at this time Filed at: 06/04/2021 0022                    MDM  Number of Diagnoses or Management Options  Dysuria: new and does not require workup  Suprapubic pain, acute: new and does not require workup     Amount and/or Complexity of Data Reviewed  Clinical lab tests: ordered and reviewed  Tests in the radiology section of CPT®: ordered and reviewed  Review and summarize past medical records: yes    Risk of Complications, Morbidity, and/or Mortality  Presenting problems: moderate  Diagnostic procedures: moderate  Management options: low    Patient Progress  Patient progress: stable     Patient is a 54-year-old female with history of  section that presents emergency department with aching intermittent nonradiating lower abdominal pain for 1 day  Patient  is at bedside providing English to [place] translation  Patient has associated symptomatology of urinary burning and frequency beginning with current ED presentation of lower abdominal pain  Patient hemodynamically stable and afebrile  Urinalysis not indicative of urinary tract infection; normal kidney function; normal electrolytes  No leukocytosis, no banding  CT abdomen pelvis with contrast-impression-No acute pathology visualized on CT of the abdomen and pelvis with IV without oral contrast "  Delivered Toradol in the ED; patient verbalized decrease in aforementioned abdominal pain symptoms status post medication  Will treat symptomatically; prescribed Motrin and Keflex and counseled patient medication administration and side effects  Follow-up with PCP  Follow-up with emergency department symptoms persist or exacerbate  Patient demonstrates verbal understanding of all clinical laboratory and imaging findings, discharge instructions, follow-up verbalized agreement current treatment plan with anguish to [place] translation     Disposition  Final diagnoses:   Dysuria   Suprapubic pain, acute     Time reflects when diagnosis was documented in both MDM as applicable and the Disposition within this note     Time User Action Codes Description Comment    2021  3:15 AM Johnella Solid Add [R30 0] Dysuria     2021  3:15 AM Johnella Solid Add [R10 2] Suprapubic pain, acute       ED Disposition     ED Disposition Condition Date/Time Comment    Discharge Stable   3:15 AM Sharon Patrick discharge to home/self care              Follow-up Information     Follow up With Specialties Details Why Contact Info [facility]   [address] [id number]  [id number]  , [place], [place], [address] Emergency Department Emergency Medicine   [address] [facility] [address] [facility] Emergency Department, [address], [place], [place], [address]          Discharge Medication List as of 6/4/2021  3:18 AM      START taking these medications    Details   cephalexin (KEFLEX) 500 mg capsule Take 1 capsule (500 mg total) by mouth every 12 (twelve) hours for 7 days, Starting Fri 6/4/2021, Until Fri 6/11/2021, Normal      ibuprofen (MOTRIN) 400 mg tablet Take 1 tablet (400 mg total) by mouth every 6 (six) hours as needed for mild pain for up to 3 days, Starting Fri 6/4/2021, Until Mon 6/7/2021, Normal         CONTINUE these medications which have NOT CHANGED    Details   diclofenac sodium (VOLTAREN) 1 % Apply 2 g topically 4 (four) times a day, Starting Thu 8/20/2020, Normal      famotidine (PEPCID) 20 mg tablet Take 1 tablet (20 mg total) by mouth 2 (two) times a day, Starting Tue 11/24/2020, Normal      nicotine (NICODERM CQ) 7 mg/24hr TD 24 hr patch Place 1 patch on the skin every 24 hours, Starting Tue 1/26/2021, Normal      pantoprazole (PROTONIX) 40 mg tablet Take 1 tablet (40 mg total) by mouth daily, Starting Tue 5/25/2021, Normal           No discharge procedures on file      PDMP Review       Value Time User    PDMP Reviewed  Yes 6/4/2021 12:28 AM Rhea Moore PA-C          ED Provider  Electronically Signed by           Rhea Moore PA-C  06/04/21 9943

## 2021-06-08 ENCOUNTER — OFFICE VISIT (OUTPATIENT)
Dept: FAMILY MEDICINE CLINIC | Facility: CLINIC | Age: 61
End: 2021-06-08

## 2021-06-08 VITALS
BODY MASS INDEX: 26.03 KG/M2 | OXYGEN SATURATION: 98 % | HEIGHT: 66 IN | DIASTOLIC BLOOD PRESSURE: 80 MMHG | SYSTOLIC BLOOD PRESSURE: 124 MMHG | HEART RATE: 94 BPM | WEIGHT: 162 LBS | RESPIRATION RATE: 18 BRPM | TEMPERATURE: 99.8 F

## 2021-06-08 DIAGNOSIS — R10.2 PELVIC PAIN: Primary | ICD-10-CM

## 2021-06-08 DIAGNOSIS — Z71.6 ENCOUNTER FOR SMOKING CESSATION COUNSELING: ICD-10-CM

## 2021-06-08 DIAGNOSIS — F17.200 TOBACCO DEPENDENCE: ICD-10-CM

## 2021-06-08 PROCEDURE — 3008F BODY MASS INDEX DOCD: CPT | Performed by: FAMILY MEDICINE

## 2021-06-08 PROCEDURE — 3008F BODY MASS INDEX DOCD: CPT | Performed by: INTERNAL MEDICINE

## 2021-06-08 PROCEDURE — 4004F PT TOBACCO SCREEN RCVD TLK: CPT | Performed by: FAMILY MEDICINE

## 2021-06-08 PROCEDURE — 99213 OFFICE O/P EST LOW 20 MIN: CPT | Performed by: FAMILY MEDICINE

## 2021-06-08 NOTE — ASSESSMENT & PLAN NOTE
Onset on 06/04/21  S/p ED eval on 06/04/21  Work up that included UA and CT scan were wnl  Pelvic exam benign today  Encouraged completion of Keflex as prescribed per ED  F/u precautions reviewed

## 2021-06-08 NOTE — PROGRESS NOTES
Assessment/Plan:       Problem List Items Addressed This Visit        Other    Tobacco dependence     Pt smokes 5-6 cigs/day  Nicotine patch prescribed at pt's request         Relevant Medications    nicotine (NICODERM CQ) 7 mg/24hr TD 24 hr patch    Pelvic pain - Primary     Onset on 06/04/21  S/p ED eval on 06/04/21  Work up that included UA and CT scan were wnl  Pelvic exam benign today  Encouraged completion of Keflex as prescribed per ED  F/u precautions reviewed           Other Visit Diagnoses     Encounter for smoking cessation counseling        Relevant Medications    nicotine (NICODERM CQ) 7 mg/24hr TD 24 hr patch            Subjective:      Patient ID: Jono Quinn is a 61 y o  female  HPI  Pt here for f/u s/p ED eval for pelvic pain on 06/04/21  Pt reports pain is improving  Reports  compliance with Keflex as prescribed(3/7)  Reports pain is suprapubic, sharp, non radiating  Denies dysuria, frequency, hematuria, frequency, vaginal discharge  Pt is asking about results of most recent labs and her CT scan results  Pt reports she plans to go back to live in [place] for good  The following portions of the patient's history were reviewed and updated as appropriate:   She  has no past medical history on file    She   Patient Active Problem List    Diagnosis Date Noted    Pelvic pain 06/08/2021    Tiredness 04/27/2021    Adjustment disorder 01/26/2021    Costochondritis, acute 12/22/2020    Adjustment reaction, depressive, brief 09/03/2020    Postmenopausal state 08/20/2020    Tobacco dependence 02/24/2020    Acute right-sided low back pain with sciatica 01/22/2020    Need for hepatitis C screening test 01/22/2020    Chest pain, atypical 01/22/2020    Painful swelling of joint 11/18/2019    Vertigo 09/17/2019    Chronic gastritis without bleeding 07/22/2019    Osteoarthritis of multiple joints 07/22/2019    Screening for cardiovascular condition 07/22/2019     She  has a past surgical history that includes  section; Colonoscopy; and Upper gastrointestinal endoscopy  Her family history includes No Known Problems in her daughter, daughter, daughter, maternal aunt, maternal aunt, maternal aunt, maternal aunt, maternal aunt, maternal grandfather, maternal grandmother, mother, paternal aunt, paternal aunt, paternal grandfather, paternal grandmother, sister, sister, and sister; Prostate cancer (age of onset: [de-identified]) in her father  She  reports that she has been smoking  She has been smoking about 0 50 packs per day  She has never used smokeless tobacco  She reports that she does not drink alcohol or use drugs  Current Outpatient Medications   Medication Sig Dispense Refill    cephalexin (KEFLEX) 500 mg capsule Take 1 capsule (500 mg total) by mouth every 12 (twelve) hours for 7 days 14 capsule 0    diclofenac sodium (VOLTAREN) 1 % Apply 2 g topically 4 (four) times a day 1 Tube 2    famotidine (PEPCID) 20 mg tablet Take 1 tablet (20 mg total) by mouth 2 (two) times a day 180 tablet 1    ibuprofen (MOTRIN) 400 mg tablet Take 1 tablet (400 mg total) by mouth every 6 (six) hours as needed for mild pain for up to 3 days 12 tablet 0    nicotine (NICODERM CQ) 7 mg/24hr TD 24 hr patch Place 1 patch on the skin every 24 hours 28 patch 2    pantoprazole (PROTONIX) 40 mg tablet Take 1 tablet (40 mg total) by mouth daily 30 tablet 3     No current facility-administered medications for this visit        Current Outpatient Medications on File Prior to Visit   Medication Sig    cephalexin (KEFLEX) 500 mg capsule Take 1 capsule (500 mg total) by mouth every 12 (twelve) hours for 7 days    diclofenac sodium (VOLTAREN) 1 % Apply 2 g topically 4 (four) times a day    famotidine (PEPCID) 20 mg tablet Take 1 tablet (20 mg total) by mouth 2 (two) times a day    ibuprofen (MOTRIN) 400 mg tablet Take 1 tablet (400 mg total) by mouth every 6 (six) hours as needed for mild pain for up to 3 days    pantoprazole (PROTONIX) 40 mg tablet Take 1 tablet (40 mg total) by mouth daily    [DISCONTINUED] nicotine (NICODERM CQ) 7 mg/24hr TD 24 hr patch Place 1 patch on the skin every 24 hours     No current facility-administered medications on file prior to visit  She is allergic to penicillins       Review of Systems   Constitutional: Negative for chills and fever  Gastrointestinal: Positive for abdominal pain  Genitourinary: Negative for dysuria and hematuria  Objective:      /80 (BP Location: Left arm, Patient Position: Sitting, Cuff Size: Standard)   Pulse 94   Temp 99 8 °F (37 7 °C) (Temporal)   Resp 18   Ht 5' 6" (1 676 m)   Wt 73 5 kg (162 lb)   SpO2 98%   BMI 26 15 kg/m²          Physical Exam  Exam conducted with a chaperone present  Constitutional:       Appearance: Normal appearance  HENT:      Head: Normocephalic and atraumatic  Eyes:      Extraocular Movements: Extraocular movements intact  Neck:      Musculoskeletal: Neck supple  Cardiovascular:      Rate and Rhythm: Normal rate and regular rhythm  Pulmonary:      Effort: No respiratory distress  Breath sounds: No wheezing or rales  Abdominal:      General: Abdomen is flat  Bowel sounds are normal  There is no distension  Palpations: Abdomen is soft  Tenderness: There is no abdominal tenderness  There is no right CVA tenderness, left CVA tenderness, guarding or rebound  Genitourinary:     Pubic Area: No rash  Vagina: Normal       Cervix: No cervical motion tenderness, discharge, lesion or erythema  Uterus: Normal  Not tender  Adnexa: Right adnexa normal and left adnexa normal         Right: No tenderness  Left: No tenderness  Musculoskeletal:      Right lower leg: No edema  Left lower leg: No edema  Skin:     General: Skin is warm and dry     Psychiatric:         Mood and Affect: Mood normal

## 2021-06-09 ENCOUNTER — TELEPHONE (OUTPATIENT)
Dept: GASTROENTEROLOGY | Facility: AMBULARY SURGERY CENTER | Age: 61
End: 2021-06-09

## 2021-06-10 ENCOUNTER — HOSPITAL ENCOUNTER (OUTPATIENT)
Dept: GASTROENTEROLOGY | Facility: AMBULARY SURGERY CENTER | Age: 61
Setting detail: OUTPATIENT SURGERY
Discharge: HOME | End: 2021-06-10
Attending: INTERNAL MEDICINE
Payer: COMMERCIAL

## 2021-06-10 ENCOUNTER — ANESTHESIA EVENT (OUTPATIENT)
Dept: GASTROENTEROLOGY | Facility: AMBULARY SURGERY CENTER | Age: 61
End: 2021-06-10

## 2021-06-10 ENCOUNTER — ANESTHESIA (OUTPATIENT)
Dept: GASTROENTEROLOGY | Facility: AMBULARY SURGERY CENTER | Age: 61
End: 2021-06-10

## 2021-06-10 VITALS
TEMPERATURE: 97 F | SYSTOLIC BLOOD PRESSURE: 93 MMHG | HEIGHT: 66 IN | DIASTOLIC BLOOD PRESSURE: 56 MMHG | HEART RATE: 69 BPM | BODY MASS INDEX: 25.55 KG/M2 | RESPIRATION RATE: 18 BRPM | WEIGHT: 159 LBS | OXYGEN SATURATION: 98 %

## 2021-06-10 DIAGNOSIS — K21.9 GASTROESOPHAGEAL REFLUX DISEASE WITHOUT ESOPHAGITIS: ICD-10-CM

## 2021-06-10 PROCEDURE — 88305 TISSUE EXAM BY PATHOLOGIST: CPT | Performed by: PATHOLOGY

## 2021-06-10 PROCEDURE — 43239 EGD BIOPSY SINGLE/MULTIPLE: CPT | Performed by: INTERNAL MEDICINE

## 2021-06-10 RX ORDER — PROPOFOL 10 MG/ML
INJECTION, EMULSION INTRAVENOUS AS NEEDED
Status: DISCONTINUED | OUTPATIENT
Start: 2021-06-10 | End: 2021-06-10

## 2021-06-10 RX ORDER — SODIUM CHLORIDE, SODIUM LACTATE, POTASSIUM CHLORIDE, CALCIUM CHLORIDE 600; 310; 30; 20 MG/100ML; MG/100ML; MG/100ML; MG/100ML
125 INJECTION, SOLUTION INTRAVENOUS CONTINUOUS
Status: DISCONTINUED | OUTPATIENT
Start: 2021-06-10 | End: 2021-06-14 | Stop reason: HOSPADM

## 2021-06-10 RX ORDER — ONDANSETRON 2 MG/ML
4 INJECTION INTRAMUSCULAR; INTRAVENOUS ONCE AS NEEDED
Status: DISCONTINUED | OUTPATIENT
Start: 2021-06-10 | End: 2021-06-14 | Stop reason: HOSPADM

## 2021-06-10 RX ORDER — SODIUM CHLORIDE, SODIUM LACTATE, POTASSIUM CHLORIDE, CALCIUM CHLORIDE 600; 310; 30; 20 MG/100ML; MG/100ML; MG/100ML; MG/100ML
20 INJECTION, SOLUTION INTRAVENOUS CONTINUOUS
Status: DISCONTINUED | OUTPATIENT
Start: 2021-06-10 | End: 2021-06-14 | Stop reason: HOSPADM

## 2021-06-10 RX ORDER — LIDOCAINE HYDROCHLORIDE 10 MG/ML
INJECTION, SOLUTION EPIDURAL; INFILTRATION; INTRACAUDAL; PERINEURAL AS NEEDED
Status: DISCONTINUED | OUTPATIENT
Start: 2021-06-10 | End: 2021-06-10

## 2021-06-10 RX ADMIN — PROPOFOL 30 MG: 10 INJECTION, EMULSION INTRAVENOUS at 11:57

## 2021-06-10 RX ADMIN — PROPOFOL 30 MG: 10 INJECTION, EMULSION INTRAVENOUS at 11:54

## 2021-06-10 RX ADMIN — LIDOCAINE HYDROCHLORIDE 50 MG: 10 INJECTION, SOLUTION EPIDURAL; INFILTRATION; INTRACAUDAL at 11:51

## 2021-06-10 RX ADMIN — PROPOFOL 100 MG: 10 INJECTION, EMULSION INTRAVENOUS at 11:51

## 2021-06-10 RX ADMIN — SODIUM CHLORIDE, SODIUM LACTATE, POTASSIUM CHLORIDE, AND CALCIUM CHLORIDE: .6; .31; .03; .02 INJECTION, SOLUTION INTRAVENOUS at 11:47

## 2021-06-10 RX ADMIN — PROPOFOL 20 MG: 10 INJECTION, EMULSION INTRAVENOUS at 11:56

## 2021-06-10 NOTE — INTERVAL H&P NOTE
H&P reviewed  After examining the patient I find no changes in the patients condition since the H&P had been written      Vitals:    06/10/21 1107   BP: 101/59   Pulse: 63   Resp: 18   Temp: (!) 96 8 °F (36 °C)   SpO2: 95%

## 2021-06-10 NOTE — ANESTHESIA PREPROCEDURE EVALUATION
Procedure:  EGD    Relevant Problems   MUSCULOSKELETAL   (+) Osteoarthritis of multiple joints      Other   (+) Chronic gastritis without bleeding   (+) Tobacco dependence        Physical Exam    Airway    Mallampati score: III  TM Distance: >3 FB  Neck ROM: full     Dental       Cardiovascular      Pulmonary      Other Findings        Anesthesia Plan  ASA Score- 2     Anesthesia Type- IV sedation with anesthesia with ASA Monitors  Additional Monitors:   Airway Plan:           Plan Factors-    Chart reviewed  Existing labs reviewed  Patient summary reviewed  Induction- intravenous  Postoperative Plan-     Informed Consent- Anesthetic plan and risks discussed with patient  I personally reviewed this patient with the CRNA  Discussed and agreed on the Anesthesia Plan with the CRNA Gerhardt Sep

## 2021-06-10 NOTE — ANESTHESIA POSTPROCEDURE EVALUATION
Post-Op Assessment Note    CV Status:  Stable  Pain Score: 0    Pain management: adequate     Mental Status:  Arousable and sleepy   Hydration Status:  Euvolemic   PONV Controlled:  Controlled   Airway Patency:  Patent      Post Op Vitals Reviewed: Yes      Staff: CRNA         No complications documented      BP  94/58    Temp     Pulse  81   Resp 16   SpO2 100

## 2021-06-17 ENCOUNTER — TELEPHONE (OUTPATIENT)
Dept: FAMILY MEDICINE CLINIC | Facility: CLINIC | Age: 61
End: 2021-06-17

## 2021-06-17 ENCOUNTER — TELEMEDICINE (OUTPATIENT)
Dept: FAMILY MEDICINE CLINIC | Facility: CLINIC | Age: 61
End: 2021-06-17

## 2021-06-17 DIAGNOSIS — R10.2 PELVIC PAIN: Primary | ICD-10-CM

## 2021-06-17 PROCEDURE — 99213 OFFICE O/P EST LOW 20 MIN: CPT | Performed by: FAMILY MEDICINE

## 2021-06-17 NOTE — TELEPHONE ENCOUNTER
Hi Dr Kolton Brothers, this pt called I used the  line and she was saying she is leaving the country tomorrow but wanted a call back from you today regarding the infection she had a week or so ago   Said she just has a question about it, I put her in your 6:50 for virtual but she was wondering if you could call her anytime sooner this afternoon

## 2021-06-17 NOTE — ASSESSMENT & PLAN NOTE
Pt reports recurrence of pelvic pain overnight s/p 7 day course of Abx prescribed at the ED on 06/04/2021  Pt is requesting refill on Abx  Work up included UA and CT scan that were both wnl  Pt denies dysuria, current pelvic pain, dysuria, frequency, urgency, urinary incontinence, vaginal discharge and fever  Encouraged completion of Keflex as prescribed per ED  Discussed use of Tylenol/NSAIDs for pain/discomfort and avoiding prolonged use of Abx to prevent resistance  RTC precautions reviewed

## 2021-06-17 NOTE — PROGRESS NOTES
Virtual Brief Visit    Assessment/Plan:    Problem List Items Addressed This Visit        Other    Pelvic pain - Primary     Pt reports recurrence of pelvic pain overnight s/p 7 day course of Abx prescribed at the ED on 06/04/2021  Pt is requesting refill on Abx  Work up included UA and CT scan that were both wnl  Pt denies dysuria, current pelvic pain, dysuria, frequency, urgency, urinary incontinence, vaginal discharge and fever  Encouraged completion of Keflex as prescribed per ED  Discussed use of Tylenol/NSAIDs for pain/discomfort and avoiding prolonged use of Abx to prevent resistance  RTC precautions reviewed                     Reason for visit is No chief complaint on file  Encounter provider Boby Real MD    Provider located at [address] [place] [id number] [facility]   [phone]    Recent Visits  No visits were found meeting these conditions  Showing recent visits within past 7 days and meeting all other requirements  Today's Visits  Date Type Provider Dept   06/17/21 Telephone [place] [id number]   06/17/21 Telemedicine Boby Real MD [facility]   Showing today's visits and meeting all other requirements  Future Appointments  No visits were found meeting these conditions  Showing future appointments within next 150 days and meeting all other requirements       After connecting through telephone, the patient was identified by name and date of birth  Vera Sheets was informed that this is a telemedicine visit and that the visit is being conducted through telephone  My office door was closed  No one else was in the room  She acknowledged consent and understanding of privacy and security of the platform  The patient has agreed to participate and understands she can discontinue the visit at any time  Patient is aware this is a billable service       Sarkis Perez is a 61 y o  female   HPI   Pt reports pelvic pain onset last night s/p completion of 7 days course of Antibiotics(Keflex) prescribed for pelvic pain at the ED on 2021  Work up was negative at the ED  Pt denies dysuria, current pelvic pain, dysuria, frequency, urgency, urinary incontinence, vaginal discharge and fever  Ibuprofen provided relief of pelvic pain  Past Medical History:   Diagnosis Date    Inflammation of bladder        Past Surgical History:   Procedure Laterality Date     SECTION      3 times    COLONOSCOPY      UPPER GASTROINTESTINAL ENDOSCOPY         Current Outpatient Medications   Medication Sig Dispense Refill    diclofenac sodium (VOLTAREN) 1 % Apply 2 g topically 4 (four) times a day 1 Tube 2    famotidine (PEPCID) 20 mg tablet Take 1 tablet (20 mg total) by mouth 2 (two) times a day 180 tablet 1    ibuprofen (MOTRIN) 400 mg tablet Take 1 tablet (400 mg total) by mouth every 6 (six) hours as needed for mild pain for up to 3 days 12 tablet 0    nicotine (NICODERM CQ) 7 mg/24hr TD 24 hr patch Place 1 patch on the skin every 24 hours 28 patch 2    pantoprazole (PROTONIX) 40 mg tablet Take 1 tablet (40 mg total) by mouth daily 30 tablet 3     No current facility-administered medications for this visit  Allergies   Allergen Reactions    Penicillins Rash     Will get urine infections       Review of Systems   Constitutional: Negative for appetite change, chills and fever  Genitourinary: Positive for pelvic pain  Neurological: Negative for headaches  There were no vitals filed for this visit  I spent 15 minutes directly with the patient during this visit    Betty Alvarado acknowledges that she has consented to an online visit or consultation   She understands that the online visit is based solely on information provided by her, and that, in the absence of a face-to-face physical evaluation by the physician, the diagnosis she receives is both limited and provisional in terms of accuracy and completeness  This is not intended to replace a full medical face-to-face evaluation by the physician  José Manuel Hobson understands and accepts these terms  It was my intent to perform this visit via video technology but the patient was not able to do a video connection so the visit was completed via audio telephone only

## 2021-12-06 ENCOUNTER — OFFICE VISIT (OUTPATIENT)
Dept: FAMILY MEDICINE CLINIC | Facility: CLINIC | Age: 61
End: 2021-12-06

## 2021-12-06 VITALS
BODY MASS INDEX: 25.55 KG/M2 | HEIGHT: 66 IN | RESPIRATION RATE: 18 BRPM | TEMPERATURE: 98.3 F | DIASTOLIC BLOOD PRESSURE: 90 MMHG | HEART RATE: 74 BPM | OXYGEN SATURATION: 97 % | SYSTOLIC BLOOD PRESSURE: 120 MMHG | WEIGHT: 159 LBS

## 2021-12-06 DIAGNOSIS — N81.89 OTHER FEMALE GENITAL PROLAPSE: ICD-10-CM

## 2021-12-06 DIAGNOSIS — F32.0 CURRENT MILD EPISODE OF MAJOR DEPRESSIVE DISORDER, UNSPECIFIED WHETHER RECURRENT (HCC): ICD-10-CM

## 2021-12-06 DIAGNOSIS — Z00.00 ANNUAL PHYSICAL EXAM: Primary | ICD-10-CM

## 2021-12-06 PROCEDURE — 99396 PREV VISIT EST AGE 40-64: CPT | Performed by: FAMILY MEDICINE

## 2021-12-06 PROCEDURE — 99213 OFFICE O/P EST LOW 20 MIN: CPT | Performed by: FAMILY MEDICINE

## 2021-12-13 ENCOUNTER — IMMUNIZATIONS (OUTPATIENT)
Dept: FAMILY MEDICINE CLINIC | Facility: CLINIC | Age: 61
End: 2021-12-13

## 2021-12-13 DIAGNOSIS — Z23 ENCOUNTER FOR IMMUNIZATION: Primary | ICD-10-CM

## 2021-12-13 PROCEDURE — 90471 IMMUNIZATION ADMIN: CPT

## 2021-12-13 PROCEDURE — 90682 RIV4 VACC RECOMBINANT DNA IM: CPT

## 2021-12-27 ENCOUNTER — OFFICE VISIT (OUTPATIENT)
Dept: URGENT CARE | Age: 61
End: 2021-12-27
Payer: COMMERCIAL

## 2021-12-27 VITALS
HEART RATE: 85 BPM | HEIGHT: 66 IN | BODY MASS INDEX: 25.55 KG/M2 | WEIGHT: 159 LBS | OXYGEN SATURATION: 99 % | TEMPERATURE: 98 F | RESPIRATION RATE: 18 BRPM

## 2021-12-27 DIAGNOSIS — R51.9 ACUTE NONINTRACTABLE HEADACHE, UNSPECIFIED HEADACHE TYPE: ICD-10-CM

## 2021-12-27 DIAGNOSIS — Z20.822 EXPOSURE TO CONFIRMED CASE OF COVID-19: Primary | ICD-10-CM

## 2021-12-27 DIAGNOSIS — R52 BODY ACHES: ICD-10-CM

## 2021-12-27 DIAGNOSIS — J02.9 SORE THROAT: ICD-10-CM

## 2021-12-27 PROCEDURE — 87636 SARSCOV2 & INF A&B AMP PRB: CPT | Performed by: PHYSICIAN ASSISTANT

## 2021-12-27 PROCEDURE — 99213 OFFICE O/P EST LOW 20 MIN: CPT | Performed by: PHYSICIAN ASSISTANT

## 2021-12-29 LAB
FLUAV RNA RESP QL NAA+PROBE: NEGATIVE
FLUBV RNA RESP QL NAA+PROBE: NEGATIVE
SARS-COV-2 RNA RESP QL NAA+PROBE: NEGATIVE

## 2022-01-24 ENCOUNTER — OFFICE VISIT (OUTPATIENT)
Dept: FAMILY MEDICINE CLINIC | Facility: CLINIC | Age: 62
End: 2022-01-24

## 2022-01-24 VITALS
SYSTOLIC BLOOD PRESSURE: 111 MMHG | OXYGEN SATURATION: 97 % | BODY MASS INDEX: 25.88 KG/M2 | WEIGHT: 161 LBS | DIASTOLIC BLOOD PRESSURE: 70 MMHG | HEART RATE: 78 BPM | HEIGHT: 66 IN | RESPIRATION RATE: 16 BRPM | TEMPERATURE: 98.6 F

## 2022-01-24 DIAGNOSIS — K29.50 OTHER CHRONIC GASTRITIS WITHOUT HEMORRHAGE: ICD-10-CM

## 2022-01-24 DIAGNOSIS — K21.9 GASTROESOPHAGEAL REFLUX DISEASE WITHOUT ESOPHAGITIS: ICD-10-CM

## 2022-01-24 DIAGNOSIS — Z71.6 ENCOUNTER FOR SMOKING CESSATION COUNSELING: ICD-10-CM

## 2022-01-24 DIAGNOSIS — Z12.2 SCREENING FOR LUNG CANCER: ICD-10-CM

## 2022-01-24 DIAGNOSIS — Z13.820 SCREENING FOR OSTEOPOROSIS: ICD-10-CM

## 2022-01-24 DIAGNOSIS — H91.93 DECREASED HEARING OF BOTH EARS: Primary | ICD-10-CM

## 2022-01-24 DIAGNOSIS — F17.200 TOBACCO DEPENDENCE: ICD-10-CM

## 2022-01-24 PROCEDURE — 99214 OFFICE O/P EST MOD 30 MIN: CPT | Performed by: FAMILY MEDICINE

## 2022-01-24 RX ORDER — PANTOPRAZOLE SODIUM 40 MG/1
40 TABLET, DELAYED RELEASE ORAL DAILY
Qty: 30 TABLET | Refills: 3 | Status: SHIPPED | OUTPATIENT
Start: 2022-01-24 | End: 2022-03-07 | Stop reason: SDUPTHER

## 2022-01-24 NOTE — ASSESSMENT & PLAN NOTE
Occasional flares with certain identified triggers which she tries to avoid   Follows with GI  · EGD 05/25/2021 showed " Flat polyp measuring smaller than 5 mm in the lower third of the esophagus  Small polyp - likely xanthelasma    Removed with cold biopsy forceps  · Moderate edematous mucosa with erosion in the stomach; performed 5 cold forceps biopsies  · Mild edematous mucosa with erosion in the duodenal bulb and 2nd part of the duodenum; performed 4 cold forceps biopsies"    Continue pantoprazole per GI and  Pepcid for BT sx

## 2022-01-24 NOTE — PROGRESS NOTES
Assessment/Plan:         Problem List Items Addressed This Visit        Digestive    Chronic gastritis without bleeding     Occasional flares with certain identified triggers which she tries to avoid   Follows with GI  · EGD 05/25/2021 showed " Flat polyp measuring smaller than 5 mm in the lower third of the esophagus  Small polyp - likely xanthelasma  Removed with cold biopsy forceps  · Moderate edematous mucosa with erosion in the stomach; performed 5 cold forceps biopsies  · Mild edematous mucosa with erosion in the duodenal bulb and 2nd part of the duodenum; performed 4 cold forceps biopsies"    Continue pantoprazole per GI and  Pepcid for BT sx           Relevant Medications    pantoprazole (PROTONIX) 40 mg tablet       Other    Tobacco dependence     Pt smokes 5-6 cigs/day  Nicotine patch prescribed at pt's request         Relevant Medications    nicotine (NICODERM CQ) 7 mg/24hr TD 24 hr patch    Decreased hearing of both ears - Primary     Onset of 1 5 months, associated with tinnitus  - Amb referral to ENT provided         Relevant Orders    Ambulatory Referral to Otolaryngology      Other Visit Diagnoses     Screening for lung cancer        Relevant Orders    CT lung screening program    Screening for osteoporosis        Relevant Orders    DXA bone density spine hip and pelvis    Gastroesophageal reflux disease without esophagitis        Relevant Medications    pantoprazole (PROTONIX) 40 mg tablet    Encounter for smoking cessation counseling        Relevant Medications    nicotine (NICODERM CQ) 7 mg/24hr TD 24 hr patch            Subjective:      Patient ID: Fitz Ruelas is a 64 y o  female  HPI   Pt here for f/u  Pt was previously evaluated for genital prolapse last month and referred to Urogyn  Pt however reports she has not been able to schedule an appt due to lack of insurance acceptance  She is awaiting further call from our office  Denies any new changes to previous sx       Pt also reports she has not been able to get into therapy  She does reports her mood is much better and she is coping and keeping herself busy  Pt has enrolled in a free English language class 2x per week which she enjoys  She amenable to therapy at this time and denies suicidal ideas, HI or hallucination  Pt reports discomfort in both ears,onset of 1 5 months  This is associated with ringing in her ears and decreased hearing  Pt denies ear discharge, use of any new medications though she admits to taking Naproxen occasionally for joint pains  Pt had a recent URI, now totally resolved  Pt works at [employer] as a  and reports this is a fairly noiseless environment  The following portions of the patient's history were reviewed and updated as appropriate:   She  has a past medical history of Inflammation of bladder  She   Patient Active Problem List    Diagnosis Date Noted    Decreased hearing of both ears 2022    Current mild episode of major depressive disorder ([facility] [address] ) 2021    Pelvic pain 2021    Tiredness 2021    Adjustment disorder 2021    Costochondritis, acute 2020    Adjustment reaction, depressive, brief 2020    Postmenopausal state 2020    Tobacco dependence 2020    Acute right-sided low back pain with sciatica 2020    Need for hepatitis C screening test 2020    Chest pain, atypical 2020    Painful swelling of joint 2019    Vertigo 2019    Chronic gastritis without bleeding 2019    Osteoarthritis of multiple joints 2019    Annual physical exam 2019     She  has a past surgical history that includes  section; Colonoscopy; and Upper gastrointestinal endoscopy    Her family history includes No Known Problems in her daughter, daughter, daughter, maternal aunt, maternal aunt, maternal aunt, maternal aunt, maternal aunt, maternal grandfather, maternal grandmother, mother, paternal aunt, paternal aunt, paternal grandfather, paternal grandmother, sister, sister, and sister; Prostate cancer (age of onset: [de-identified]) in her father  She  reports that she has been smoking  She has been smoking about 0 50 packs per day  She has never used smokeless tobacco  She reports that she does not drink alcohol and does not use drugs  Current Outpatient Medications   Medication Sig Dispense Refill    diclofenac sodium (VOLTAREN) 1 % Apply 2 g topically 4 (four) times a day 1 Tube 2    famotidine (PEPCID) 20 mg tablet Take 1 tablet (20 mg total) by mouth 2 (two) times a day 180 tablet 1    nicotine (NICODERM CQ) 7 mg/24hr TD 24 hr patch Place 1 patch on the skin every 24 hours 28 patch 2    pantoprazole (PROTONIX) 40 mg tablet Take 1 tablet (40 mg total) by mouth daily 30 tablet 3     No current facility-administered medications for this visit  Current Outpatient Medications on File Prior to Visit   Medication Sig    diclofenac sodium (VOLTAREN) 1 % Apply 2 g topically 4 (four) times a day    famotidine (PEPCID) 20 mg tablet Take 1 tablet (20 mg total) by mouth 2 (two) times a day    [DISCONTINUED] ibuprofen (MOTRIN) 400 mg tablet Take 1 tablet (400 mg total) by mouth every 6 (six) hours as needed for mild pain for up to 3 days    [DISCONTINUED] nicotine (NICODERM CQ) 7 mg/24hr TD 24 hr patch Place 1 patch on the skin every 24 hours    [DISCONTINUED] pantoprazole (PROTONIX) 40 mg tablet Take 1 tablet (40 mg total) by mouth daily     No current facility-administered medications on file prior to visit  She is allergic to penicillins       Review of Systems   Constitutional: Negative for fatigue and fever  HENT: Positive for hearing loss (decreased) and tinnitus            Objective:      /70 (BP Location: Right arm, Patient Position: Sitting, Cuff Size: Standard)   Pulse 78   Temp 98 6 °F (37 °C) (Temporal)   Resp 16   Ht 5' 6" (1 676 m)   Wt 73 kg (161 lb)   SpO2 97%   BMI 25 99 kg/m² Physical Exam  Constitutional:       Appearance: Normal appearance  HENT:      Head: Normocephalic and atraumatic  Right Ear: Tympanic membrane, ear canal and external ear normal  There is no impacted cerumen  Left Ear: Tympanic membrane, ear canal and external ear normal  There is no impacted cerumen  Eyes:      Extraocular Movements: Extraocular movements intact  Cardiovascular:      Rate and Rhythm: Normal rate  Pulses: Normal pulses  Pulmonary:      Effort: Pulmonary effort is normal  No respiratory distress  Breath sounds: No wheezing or rales  Musculoskeletal:      Right lower leg: No edema  Left lower leg: No edema  Skin:     General: Skin is warm and dry     Psychiatric:         Mood and Affect: Mood normal

## 2022-01-26 ENCOUNTER — PATIENT OUTREACH (OUTPATIENT)
Dept: FAMILY MEDICINE CLINIC | Facility: CLINIC | Age: 62
End: 2022-01-26

## 2022-01-26 DIAGNOSIS — Z76.89 ENCOUNTER FOR SOCIAL WORK INTERVENTION: Primary | ICD-10-CM

## 2022-01-26 NOTE — PROGRESS NOTES
Received request to contact patient to provide support regarding outpatient mental health treatment  Call to patient via [address]  #[id number]  Patient confirms that she is interested in becoming involved with outpatient mental health treatment and will require [employer] speaking services  Reviewed with patient several local in network [employer] speaking mental health treatment options and contact information provided  Patient reports that she is able to call to make an appointment  Supportive counseling provided to patient who denies further needs at this time  Will continue to be available to provide support

## 2022-01-27 ENCOUNTER — TELEPHONE (OUTPATIENT)
Dept: FAMILY MEDICINE CLINIC | Facility: CLINIC | Age: 62
End: 2022-01-27

## 2022-01-27 NOTE — TELEPHONE ENCOUNTER
Patient wants to get some medicine for earache while she gets specialists to make appointment  Patient can be reached at [phone]

## 2022-01-27 NOTE — TELEPHONE ENCOUNTER
Please call regarding the appointment you gave her   The Urologist does not take the insurance  Patient can be reached at [phone]

## 2022-02-11 ENCOUNTER — CONSULT (OUTPATIENT)
Dept: MULTI SPECIALTY CLINIC | Facility: CLINIC | Age: 62
End: 2022-02-11

## 2022-02-11 VITALS
HEART RATE: 69 BPM | WEIGHT: 159.2 LBS | DIASTOLIC BLOOD PRESSURE: 71 MMHG | TEMPERATURE: 98.1 F | BODY MASS INDEX: 25.7 KG/M2 | SYSTOLIC BLOOD PRESSURE: 109 MMHG

## 2022-02-11 DIAGNOSIS — H93.13 TINNITUS OF BOTH EARS: Primary | ICD-10-CM

## 2022-02-11 DIAGNOSIS — H91.93 DECREASED HEARING OF BOTH EARS: ICD-10-CM

## 2022-02-11 PROCEDURE — 99243 OFF/OP CNSLTJ NEW/EST LOW 30: CPT | Performed by: OTOLARYNGOLOGY

## 2022-02-11 NOTE — PROGRESS NOTES
[facility] Otolaryngology New Patient Visit    Edna Miranda is a 64 y o  who presents with a chief complaint of tinnitus    Independent historian:  Roommate    Pertinent elements of the history include:  Bilateral high-frequency tenderness present for over 1 year, history of hearing loss    Her twin sister has a history of hearing loss as well as tinnitus and wears hearing aids      Review of systems 10 point review of systems reviewed as documented in the intake form, scanned into the medical record under the media tab  Review of Systems - see attached ROS form      Results reviewed; images from any scan have been personally reviewed:  Images:   Notes:   Labs: The past medical, surgical, social and family history have been reviewed as documented in today's record  Physical exam: (abnormal findings appear in bold and supercede any conflicting normal findings listed below)    /71 (BP Location: Right arm, Patient Position: Sitting, Cuff Size: Standard)   Pulse 69   Temp 98 1 °F (36 7 °C) (Temporal)   Wt 72 2 kg (159 lb 3 2 oz)   BMI 25 70 kg/m²     Constitutional:  Well developed, well nourished and groomed, in no acute distress  Eyes:  Extra-ocular movements intact, pupils equally round and reactive to light and accommodation, the lids and conjunctivae are normal in appearance  Head: Atraumatic, normocephalic, no visible scalp lesions, bony palpation unremarkable without stepoffs, parotid and submandibular salivary glands non-tender to palpation and without masses bilaterally  Ears:  Auricles normal in appearance bilaterally, mastoid prominence non-tender, external auditory canals clear bilaterally, tympanic membranes intact bilaterally without evidence of middle ear effusion or masses, normal appearing ossicles  Nose/Sinuses:  External appearance unremarkable, no maxillary or frontal sinus tenderness to palpation bilaterally   Anterior rhinoscopy reveals normal nasal mucosa, septum, turbinates  Oral Cavity:  Moist mucus membranes, no oral mucosal masses or lesions, floor of mouth soft, tongue mobile without masses or lesions  Oropharynx:  Base of tongue soft and without masses, tonsils bilaterally unremarkable, soft palate mucosa unremarkable  Neck:  No visible or palpable cervical lesions or lymphadenopathy, thyroid gland is normal in size and symmetry and without masses, normal laryngeal elevation with swallowing  Cardiovascular:  Normal rate and rhythm, no palpable thrills, no jugulovenous distension observed  Respiratory:  Normal respiratory effort without evidence of retractions or use of accessory muscles  Integument:  Normal appearing without observed masses or lesions  Neurologic:  Cranial nerves II-XII intact bilaterally  Psychiatric:  Alert and oriented to time, place and person, normal affect  Procedures      Assessment:   1  Tinnitus of both ears     2  Decreased hearing of both ears  Ambulatory Referral to Otolaryngology       Orders  No orders of the defined types were placed in this encounter  Discussion/Plan:    1  Tinnitus is usually a consequence of hearing loss  We discussed things that can exacerbate tinnitus include NSAIDs, caffeine, alcohol, stress  We discussed use of masking devices like low tone radio/white noise to block tinnitus out at bedtime  We also discussed role for low dose benzodiazepine that can sometimes relieve tinnitus that is severe for prn use  In severe instances, maskers/neuromonics can be used  Plan for audiogram at [facility]        Thank you for allowing me to participate in the care of your patient

## 2022-02-18 ENCOUNTER — OFFICE VISIT (OUTPATIENT)
Dept: AUDIOLOGY | Age: 62
End: 2022-02-18
Payer: MEDICARE

## 2022-02-18 DIAGNOSIS — H90.3 SENSORY HEARING LOSS, BILATERAL: Primary | ICD-10-CM

## 2022-02-18 PROCEDURE — 92567 TYMPANOMETRY: CPT | Performed by: AUDIOLOGIST

## 2022-02-18 PROCEDURE — 92557 COMPREHENSIVE HEARING TEST: CPT | Performed by: AUDIOLOGIST

## 2022-03-03 ENCOUNTER — OFFICE VISIT (OUTPATIENT)
Dept: OBGYN CLINIC | Facility: CLINIC | Age: 62
End: 2022-03-03

## 2022-03-03 VITALS
BODY MASS INDEX: 25.39 KG/M2 | DIASTOLIC BLOOD PRESSURE: 63 MMHG | HEART RATE: 79 BPM | WEIGHT: 158 LBS | HEIGHT: 66 IN | SYSTOLIC BLOOD PRESSURE: 102 MMHG

## 2022-03-03 DIAGNOSIS — Z12.4 CERVICAL CANCER SCREENING: Primary | ICD-10-CM

## 2022-03-03 PROCEDURE — G0145 SCR C/V CYTO,THINLAYER,RESCR: HCPCS | Performed by: OBSTETRICS & GYNECOLOGY

## 2022-03-03 PROCEDURE — 99213 OFFICE O/P EST LOW 20 MIN: CPT | Performed by: OBSTETRICS & GYNECOLOGY

## 2022-03-03 PROCEDURE — 87624 HPV HI-RISK TYP POOLED RSLT: CPT

## 2022-03-03 NOTE — PROGRESS NOTES
OB/GYN VISIT  Ade Hazel  3/3/2022  5:10 PM      Assessment/Plan:    Ade Hazel is a 64 y o   female with grade 2 cystocele and grade 1 rectocele  Discussed expectant management vs pessary vs surgical management  Patient declines expectant management at this time  At this time, patient would like to try pessary  -RTC in 1-2 weeks for pessary fitting    Subjective:     Ade Hazel is a 64 y o   female who presents for evaluation of vaginal bulge  Patient reports this has been present for approximately 6 months  She does not have pain, however she does have pelvic discomfort, particularly with standing and working  She reports no bleeding or vaginal discharge  She reports no symptoms of urinary or fecal incontinence  She is not sexually active  OB/GYN Hx: Patient has a history of  delivery x3  She went into menopause in 2018 and has no bleeding since then  PMH: GERD  PSH: C/s x3    Objective:    Vitals: Blood pressure 102/63, pulse 79, height 5' 6" (1 676 m), weight 71 7 kg (158 lb)  Body mass index is 25 5 kg/m²  Past Medical History:   Diagnosis Date    Inflammation of bladder      Past Surgical History:   Procedure Laterality Date     SECTION      3 times    COLONOSCOPY      UPPER GASTROINTESTINAL ENDOSCOPY         Physical Exam  Constitutional:       Appearance: Normal appearance  HENT:      Head: Normocephalic and atraumatic  Eyes:      Extraocular Movements: Extraocular movements intact  Pupils: Pupils are equal, round, and reactive to light  Cardiovascular:      Rate and Rhythm: Normal rate  Pulmonary:      Effort: No respiratory distress  Abdominal:      Palpations: Abdomen is soft  Tenderness: There is no abdominal tenderness  Genitourinary:     General: Normal vulva  Comments: Grade 2 cystocele  Minimal cervical descensus   Grade 1 rectocele  Neurological:      General: No focal deficit present        Mental Status: She is alert and oriented to person, place, and time             Maximino Brown MD  3/3/2022  5:10 PM

## 2022-03-07 ENCOUNTER — OFFICE VISIT (OUTPATIENT)
Dept: FAMILY MEDICINE CLINIC | Facility: CLINIC | Age: 62
End: 2022-03-07

## 2022-03-07 VITALS
HEIGHT: 66 IN | HEART RATE: 79 BPM | RESPIRATION RATE: 16 BRPM | SYSTOLIC BLOOD PRESSURE: 102 MMHG | TEMPERATURE: 98.4 F | WEIGHT: 158 LBS | OXYGEN SATURATION: 98 % | BODY MASS INDEX: 25.39 KG/M2 | DIASTOLIC BLOOD PRESSURE: 65 MMHG

## 2022-03-07 DIAGNOSIS — K21.9 GASTROESOPHAGEAL REFLUX DISEASE WITHOUT ESOPHAGITIS: ICD-10-CM

## 2022-03-07 DIAGNOSIS — Z71.6 ENCOUNTER FOR SMOKING CESSATION COUNSELING: ICD-10-CM

## 2022-03-07 DIAGNOSIS — K29.50 CHRONIC GASTRITIS WITHOUT BLEEDING, UNSPECIFIED GASTRITIS TYPE: Primary | ICD-10-CM

## 2022-03-07 DIAGNOSIS — M25.40 PAINFUL SWELLING OF JOINT: ICD-10-CM

## 2022-03-07 DIAGNOSIS — F17.200 TOBACCO DEPENDENCE: ICD-10-CM

## 2022-03-07 DIAGNOSIS — Z23 ENCOUNTER FOR IMMUNIZATION: ICD-10-CM

## 2022-03-07 PROCEDURE — 90715 TDAP VACCINE 7 YRS/> IM: CPT | Performed by: FAMILY MEDICINE

## 2022-03-07 PROCEDURE — 90471 IMMUNIZATION ADMIN: CPT | Performed by: FAMILY MEDICINE

## 2022-03-07 PROCEDURE — 99214 OFFICE O/P EST MOD 30 MIN: CPT | Performed by: FAMILY MEDICINE

## 2022-03-07 RX ORDER — PANTOPRAZOLE SODIUM 40 MG/1
40 TABLET, DELAYED RELEASE ORAL DAILY
Qty: 30 TABLET | Refills: 3 | Status: SHIPPED | OUTPATIENT
Start: 2022-03-07

## 2022-03-07 RX ORDER — FAMOTIDINE 20 MG/1
20 TABLET, FILM COATED ORAL 2 TIMES DAILY
Qty: 180 TABLET | Refills: 1 | Status: SHIPPED | OUTPATIENT
Start: 2022-03-07

## 2022-03-07 NOTE — PROGRESS NOTES
Assessment/Plan:       Problem List Items Addressed This Visit        Digestive    Chronic gastritis without bleeding - Primary     Stable and follows with GI  Continue pantoprazole pay GI and Pepcid for breakthrough symptoms         Relevant Medications    famotidine (PEPCID) 20 mg tablet    pantoprazole (PROTONIX) 40 mg tablet       Other    Painful swelling of joint    Relevant Medications    famotidine (PEPCID) 20 mg tablet    Tobacco dependence     Nicotine patch prescribed at patient's request         Relevant Medications    nicotine (NICODERM CQ) 7 mg/24hr TD 24 hr patch      Other Visit Diagnoses     Encounter for immunization        Relevant Orders    TDAP VACCINE GREATER THAN OR EQUAL TO 6YO IM (Completed)    Encounter for smoking cessation counseling        Relevant Medications    nicotine (NICODERM CQ) 7 mg/24hr TD 24 hr patch    Gastroesophageal reflux disease without esophagitis        Relevant Medications    famotidine (PEPCID) 20 mg tablet    pantoprazole (PROTONIX) 40 mg tablet            Subjective:      Patient ID: Jodi Bradford is a 64 y o  female  HPI  Patient here for follow-up  Has no complains  Pt reports she is on a diet and exercising twice per week  She recently followed with you regarding for pelvic discomfort  Found to have a grade 2 cystocele and a grade 1 rectocele  Patient opted for pessary  Has appt for insertion later this month  Patient also followed with ENT and audiology for tinnitus  Chart reviewed, pt reports she was advised to get hearing aids  She will follow-up with them for clarification  The following portions of the patient's history were reviewed and updated as appropriate:   She  has a past medical history of Inflammation of bladder    She   Patient Active Problem List    Diagnosis Date Noted    Decreased hearing of both ears 01/24/2022    Current mild episode of major depressive disorder ([facility] [address] ) 12/06/2021    Pelvic pain 06/08/2021    Tiredness 2021    Adjustment disorder 2021    Costochondritis, acute 2020    Adjustment reaction, depressive, brief 2020    Postmenopausal state 2020    Tobacco dependence 2020    Acute right-sided low back pain with sciatica 2020    Need for hepatitis C screening test 2020    Chest pain, atypical 2020    Painful swelling of joint 2019    Vertigo 2019    Chronic gastritis without bleeding 2019    Osteoarthritis of multiple joints 2019    Annual physical exam 2019     She  has a past surgical history that includes  section; Colonoscopy; and Upper gastrointestinal endoscopy  Her family history includes No Known Problems in her daughter, daughter, daughter, maternal aunt, maternal aunt, maternal aunt, maternal aunt, maternal aunt, maternal grandfather, maternal grandmother, mother, paternal aunt, paternal aunt, paternal grandfather, paternal grandmother, sister, sister, and sister; Prostate cancer (age of onset: [de-identified]) in her father  She  reports that she has been smoking  She has been smoking about 0 50 packs per day  She has never used smokeless tobacco  She reports that she does not drink alcohol and does not use drugs  Current Outpatient Medications   Medication Sig Dispense Refill    pantoprazole (PROTONIX) 40 mg tablet Take 1 tablet (40 mg total) by mouth daily 30 tablet 3    diclofenac sodium (VOLTAREN) 1 % Apply 2 g topically 4 (four) times a day (Patient not taking: Reported on 2022 ) 1 Tube 2    famotidine (PEPCID) 20 mg tablet Take 1 tablet (20 mg total) by mouth 2 (two) times a day 180 tablet 1    nicotine (NICODERM CQ) 7 mg/24hr TD 24 hr patch Place 1 patch on the skin every 24 hours 28 patch 2     No current facility-administered medications for this visit       Current Outpatient Medications on File Prior to Visit   Medication Sig    [DISCONTINUED] pantoprazole (PROTONIX) 40 mg tablet Take 1 tablet (40 mg total) by mouth daily    diclofenac sodium (VOLTAREN) 1 % Apply 2 g topically 4 (four) times a day (Patient not taking: Reported on 2/11/2022 )    [DISCONTINUED] famotidine (PEPCID) 20 mg tablet Take 1 tablet (20 mg total) by mouth 2 (two) times a day (Patient not taking: Reported on 2/11/2022 )    [DISCONTINUED] nicotine (NICODERM CQ) 7 mg/24hr TD 24 hr patch Place 1 patch on the skin every 24 hours (Patient not taking: Reported on 2/11/2022 )     No current facility-administered medications on file prior to visit  She is allergic to penicillins       Review of Systems   Constitutional: Negative  HENT: Positive for tinnitus  Respiratory: Negative  Cardiovascular: Negative  Objective:      /65 (BP Location: Left arm, Patient Position: Sitting, Cuff Size: Standard)   Pulse 79   Temp 98 4 °F (36 9 °C) (Temporal)   Resp 16   Ht 5' 6" (1 676 m)   Wt 71 7 kg (158 lb)   SpO2 98%   BMI 25 50 kg/m²          Physical Exam  Constitutional:       Appearance: Normal appearance  HENT:      Head: Normocephalic and atraumatic  Eyes:      Extraocular Movements: Extraocular movements intact  Cardiovascular:      Rate and Rhythm: Normal rate  Pulmonary:      Effort: Pulmonary effort is normal  No respiratory distress  Musculoskeletal:      Cervical back: Neck supple  Right lower leg: No edema  Left lower leg: No edema  Skin:     General: Skin is warm and dry  Neurological:      Mental Status: Mental status is at baseline     Psychiatric:         Mood and Affect: Mood normal

## 2022-03-09 LAB
LAB AP GYN PRIMARY INTERPRETATION: NORMAL
Lab: NORMAL

## 2022-03-11 DIAGNOSIS — Z12.4 CERVICAL CANCER SCREENING: Primary | ICD-10-CM

## 2022-03-16 LAB
HPV HR 12 DNA CVX QL NAA+PROBE: NEGATIVE
HPV16 DNA CVX QL NAA+PROBE: NEGATIVE
HPV18 DNA CVX QL NAA+PROBE: NEGATIVE

## 2022-03-17 ENCOUNTER — TELEPHONE (OUTPATIENT)
Dept: OBGYN CLINIC | Facility: CLINIC | Age: 62
End: 2022-03-17

## 2022-03-17 NOTE — TELEPHONE ENCOUNTER
----- Message from Teena Low MD sent at 3/17/2022  8:31 AM EDT -----  Please let this patient know that her pap and HPV are negative
Patient notified of negative pap/ HPV results and verbalized understanding 
complains of pain/discomfort

## 2022-03-24 ENCOUNTER — OFFICE VISIT (OUTPATIENT)
Dept: OBGYN CLINIC | Facility: CLINIC | Age: 62
End: 2022-03-24

## 2022-03-24 VITALS
BODY MASS INDEX: 25.18 KG/M2 | DIASTOLIC BLOOD PRESSURE: 75 MMHG | SYSTOLIC BLOOD PRESSURE: 111 MMHG | WEIGHT: 156 LBS | HEART RATE: 74 BPM

## 2022-03-24 DIAGNOSIS — Z46.89 ENCOUNTER FOR FITTING AND ADJUSTMENT OF PESSARY: Primary | ICD-10-CM

## 2022-03-24 PROCEDURE — 99213 OFFICE O/P EST LOW 20 MIN: CPT | Performed by: OBSTETRICS & GYNECOLOGY

## 2022-03-24 NOTE — PATIENT INSTRUCTIONS
Thank you for your confidence in our team    We appreciate you and welcome your feedback  If you receive a survey from us, please take a few moments to let us know how we are doing     Sincerely,  Maximino Brown MD

## 2022-03-25 PROBLEM — R53.83 TIREDNESS: Status: RESOLVED | Noted: 2021-04-27 | Resolved: 2022-03-25

## 2022-03-25 PROBLEM — Z46.89 ENCOUNTER FOR FITTING AND ADJUSTMENT OF PESSARY: Status: ACTIVE | Noted: 2022-03-25

## 2022-03-25 PROBLEM — M94.0 COSTOCHONDRITIS, ACUTE: Status: RESOLVED | Noted: 2020-12-22 | Resolved: 2022-03-25

## 2022-03-25 NOTE — PROGRESS NOTES
OB/GYN VISIT  Edna Miranda  3/25/2022  9:58 AM      Assessment/Plan:    Edna Miranda is a 64 y o   who presents for pessary placement  Problem List Items Addressed This Visit        Other    Encounter for fitting and adjustment of pessary - Primary     Ring pessary with support #3 placed  Patient advised to call immediately with any development of urinary retention  Advised if pessary were to fall out, she should call to make an appointment, otherwise RTC in 2 weeks for re-evaluation                   Subjective:     Edna Miranda is a 64 y o   female who presents for pessary fitting  Patient was noted to have grade 2 cystocele and grade 1 rectocele at last appointment  She has vaginal bulge symptoms but denies incontinence  Objective:    Vitals: Blood pressure 111/75, pulse 74, weight 70 8 kg (156 lb)  Body mass index is 25 18 kg/m²  Past Medical History:   Diagnosis Date    Inflammation of bladder      Past Surgical History:   Procedure Laterality Date     SECTION      3 times    COLONOSCOPY      UPPER GASTROINTESTINAL ENDOSCOPY         Physical Exam  Constitutional:       General: She is not in acute distress  Appearance: Normal appearance  She is not ill-appearing or toxic-appearing  HENT:      Head: Normocephalic and atraumatic  Eyes:      Extraocular Movements: Extraocular movements intact  Pupils: Pupils are equal, round, and reactive to light  Genitourinary:     General: Normal vulva  Comments: Trial of ring pessary with support size 3 and size 4 conducted in office  Patient reported more comfort with size 3 with no pain noted  Neurological:      Mental Status: She is alert             Lluvia Syed MD  3/25/2022  9:58 AM

## 2022-03-25 NOTE — ASSESSMENT & PLAN NOTE
Ring pessary with support #3 placed  Patient advised to call immediately with any development of urinary retention  Advised if pessary were to fall out, she should call to make an appointment, otherwise RTC in 2 weeks for re-evaluation

## 2022-04-07 ENCOUNTER — OFFICE VISIT (OUTPATIENT)
Dept: OBGYN CLINIC | Facility: CLINIC | Age: 62
End: 2022-04-07

## 2022-04-07 VITALS
WEIGHT: 155 LBS | DIASTOLIC BLOOD PRESSURE: 61 MMHG | BODY MASS INDEX: 25.02 KG/M2 | HEART RATE: 66 BPM | SYSTOLIC BLOOD PRESSURE: 93 MMHG

## 2022-04-07 DIAGNOSIS — Z46.89 PESSARY MAINTENANCE: Primary | ICD-10-CM

## 2022-04-07 PROCEDURE — 99213 OFFICE O/P EST LOW 20 MIN: CPT | Performed by: OBSTETRICS & GYNECOLOGY

## 2022-04-07 RX ORDER — ESTRADIOL 0.1 MG/G
2 CREAM VAGINAL 2 TIMES WEEKLY
Qty: 42.5 G | Refills: 1 | Status: SHIPPED | OUTPATIENT
Start: 2022-04-07

## 2022-04-07 NOTE — PROGRESS NOTES
OB/GYN VISIT  Juventino Ragsdale  2022  6:08 PM      Assessment/Plan:    Juventino Ragsdale is a 64 y o   who presents for pessary check    Problem List Items Addressed This Visit        Other    Pessary maintenance - Primary     #3 ring pessary with support in place  Patient tolerating well  Advised use of vaginal estrogen  RTC in 3 months for pessary check         Relevant Medications    estradiol (ESTRACE VAGINAL) 0 1 mg/g vaginal cream            Subjective:     Juventino Ragsdale is a 64 y o   female who presents for pessary check  She reports that she has been doing well with the pessary for the last two weeks  She denies pain, vaginal bleeding or dislodgment of the pessary  Objective:    Vitals: Blood pressure 93/61, pulse 66, weight 70 3 kg (155 lb)  Body mass index is 25 02 kg/m²  Past Medical History:   Diagnosis Date    Inflammation of bladder      Past Surgical History:   Procedure Laterality Date     SECTION      3 times    COLONOSCOPY      UPPER GASTROINTESTINAL ENDOSCOPY         Physical Exam  Constitutional:       General: She is not in acute distress  Appearance: Normal appearance  She is not ill-appearing or toxic-appearing  HENT:      Head: Normocephalic and atraumatic  Eyes:      Extraocular Movements: Extraocular movements intact  Pupils: Pupils are equal, round, and reactive to light  Genitourinary:     General: Normal vulva  Comments: #3 ring pessary removed and cleaned; vagina inspected, no excoriations or lesions noted; pessary replaced; patient tolerated well  Neurological:      Mental Status: She is alert             Evalina Snellen, MD  2022  6:08 PM

## 2022-04-07 NOTE — ASSESSMENT & PLAN NOTE
#3 ring pessary with support in place  Patient tolerating well  Advised use of vaginal estrogen  RTC in 3 months for pessary check

## 2022-05-03 ENCOUNTER — HOSPITAL ENCOUNTER (OUTPATIENT)
Dept: RADIOLOGY | Age: 62
Discharge: HOME | End: 2022-05-03
Payer: MEDICARE

## 2022-05-03 DIAGNOSIS — Z12.2 SCREENING FOR LUNG CANCER: ICD-10-CM

## 2022-05-03 PROCEDURE — 71271 CT THORAX LUNG CANCER SCR C-: CPT

## 2022-05-06 ENCOUNTER — TELEPHONE (OUTPATIENT)
Dept: FAMILY MEDICINE CLINIC | Facility: CLINIC | Age: 62
End: 2022-05-06

## 2022-06-16 ENCOUNTER — HOSPITAL ENCOUNTER (OUTPATIENT)
Dept: RADIOLOGY | Age: 62
Discharge: HOME | End: 2022-06-16
Payer: MEDICARE

## 2022-06-16 DIAGNOSIS — Z13.820 SCREENING FOR OSTEOPOROSIS: ICD-10-CM

## 2022-06-16 PROCEDURE — 77080 DXA BONE DENSITY AXIAL: CPT

## 2022-06-20 ENCOUNTER — TELEPHONE (OUTPATIENT)
Dept: FAMILY MEDICINE CLINIC | Facility: CLINIC | Age: 62
End: 2022-06-20

## 2022-06-20 NOTE — TELEPHONE ENCOUNTER
Patient was called the appointment made for /22 did not include PCP precepting so patient was called LM to call back to schedule appointment while PCP is precepting to discuss her DXA scan results  Patient of Dr Luz Maria Cross

## 2022-06-20 NOTE — RESULT ENCOUNTER NOTE
Patient scheduled with Karl Allen to discuss her DXA scan results PCP not available until end of July

## 2022-06-20 NOTE — TELEPHONE ENCOUNTER
----- Message from Mimi Borden MD sent at 6/20/2022  9:10 AM EDT -----  Please schedule an appointment with PCP or while PCP is precepting to discuss her DXA scan results  Patient of Dr Christofer Cole

## 2022-06-29 ENCOUNTER — OFFICE VISIT (OUTPATIENT)
Dept: FAMILY MEDICINE CLINIC | Facility: CLINIC | Age: 62
End: 2022-06-29

## 2022-06-29 VITALS
WEIGHT: 156.6 LBS | HEIGHT: 66 IN | TEMPERATURE: 97 F | DIASTOLIC BLOOD PRESSURE: 67 MMHG | SYSTOLIC BLOOD PRESSURE: 100 MMHG | OXYGEN SATURATION: 97 % | HEART RATE: 75 BPM | RESPIRATION RATE: 18 BRPM | BODY MASS INDEX: 25.17 KG/M2

## 2022-06-29 DIAGNOSIS — M81.0 AGE-RELATED OSTEOPOROSIS WITHOUT CURRENT PATHOLOGICAL FRACTURE: Primary | ICD-10-CM

## 2022-06-29 DIAGNOSIS — Z12.31 SCREENING MAMMOGRAM, ENCOUNTER FOR: ICD-10-CM

## 2022-06-29 PROBLEM — R07.89 CHEST PAIN, ATYPICAL: Status: RESOLVED | Noted: 2020-01-22 | Resolved: 2022-06-29

## 2022-06-29 PROCEDURE — 99213 OFFICE O/P EST LOW 20 MIN: CPT | Performed by: PHYSICIAN ASSISTANT

## 2022-06-29 RX ORDER — ASPIRIN 81 MG
1 TABLET, DELAYED RELEASE (ENTERIC COATED) ORAL DAILY
Qty: 30 TABLET | Refills: 2 | Status: SHIPPED | OUTPATIENT
Start: 2022-06-29

## 2022-06-29 RX ORDER — IBANDRONATE SODIUM 150 MG/1
150 TABLET, FILM COATED ORAL
Qty: 3 TABLET | Refills: 0 | Status: SHIPPED | OUTPATIENT
Start: 2022-06-29

## 2022-06-29 NOTE — PATIENT INSTRUCTIONS
Osteoporosis   CUIDADO AMBULATORIO:   Osteoporosis La osteoporosis es [facility] condición médica de festus plazo que provoca que los huesos se pongan débiles, frágiles y propensos a las fracturas  La osteoporosis ocurre cuando el cuerpo absorbe más hueso del que produce  [place] también se debe a la falta de calcio y de estrógeno (hormona femenina)  Los síntomas más comunes [place] siguientes: Es posible que usted no tenga ningún signo o síntoma  Usted podría fracturarse un hueso después de un esfuerzo muscular, un golpe o paul caída  La fractura usualmente ocurre en la cadera, en la columna vertebral o en la keisha  Paul vértebra que ha sufrido un colapso (hueso de la medula villavicencio) podría provocar un dolor severo en la espalda o pérdida de estatura por tener paul postura doblada  Llame a tseven médico si:  Usted tiene dolor intenso  Presenta un dolor progresivo después de paul caída  Usted tiene dolor mientras hace rajinder actividades diarias  Usted tiene preguntas o inquietudes acerca de steven condición o cuidado  Diagnóstico de osteoporosis:  Exámenes de [facility] y [facility] miden el calcio, la vitamina D y los niveles de estrógeno  Julaine Orn o paul tomografía computarizada podrían mostrar el debilitamiento de los huesos o paul fractura  Es posible que le administren un medio de contraste que sirve para glenda con mayor claridad [place]  Dígale al médico si usted alguna vez ha tenido paul reacción alérgica al líquido de [place]  No entre a la darci donde se realiza la resonancia magnética con algo de metal  El metal puede causar lesiones serias  Dígale al médico si usted tiene algo de metal dentro de steven cuerpo o por encima  Un examen de densidad ósea compara la densidad del hueso con lo que se espera de paul persona de steven edad, sexo y etnicidad  El tratamiento para la osteoporosis podría incluir medicamentos para evitar la pérdida de [place], para producir hueso nuevo y para aumentar el estrógeno   Estos medicamentos ayudan a evitar las fracturas y podrían administrarse en forma de píldora o inyección  Consulte con steven médico para obtener más información [employer]  Evite la pérdida ósea:      Consuma alimentos saludables ricos en calcio  [place] ayuda a [employer]  [place], el queso, el brócoli, el tofu, las almendras y el salmón y gina enlatados son buenas ramires de calcio  Aumenta el consumo de vitamina D  La vitamina D se encuentra en los aceites de pescado, en algunos vegetales y en la leche fortificada, los cereales y el pan  La vitamina D también se forma en la piel cuando es expuesta al sol  Pregunte a steven médico cuál es la cantidad de [employer] que son seguros para usted  [place] líquidos roque se le haya indicado  Pregunte a steven médico sobre la cantidad de líquido que necesita alistair todos los días y cuáles le recomienda  No consuma alcohol ni cafeína Estos disminuyen la densidad mineral del hueso, lo cual puede [place]  Realice actividad física con regularidad  Pregunte a steven médico acerca del mejor plan de ejercicio para usted  Hank ejercicios con carga jenn 30 minutos, 3 veces por semana para ayudar a desarrollar y [employer]  No fume  La nicotina y otras sustancias químicas que contienen los cigarrillos y cigarros pueden dañar los pulmones  Pida información a steven médico si usted actualmente fuma y necesita ayuda para dejar de fumar  Los cigarrillos electrónicos o el tabaco sin humo igualmente contienen nicotina  Consulte con steven médico antes de [employer]  Vaya a terapia física según indicaciones  Un fisioterapeuta le enseñará ejercicios para mejorar el movimiento y la fuerza muscular  Acuda a la consulta de control con steven médico según las indicaciones: Anote rajinder preguntas para que se acuerde de hacerlas jenn rajinder visitas    © Copyright [employer] 2022 Information is for End User's use only and may not be sold, redistributed or otherwise used for commercial purposes  All illustrations and images included in CareNotes® are the copyrighted property of [employer]  or [address] [place] [address] es sólo para uso en educación  Steven intención no es darle un consejo médico sobre enfermedades o tratamientos  Colsulte con steven Cori Horde farmacéutico antes de seguir cualquier régimen médico para saber si es seguro y efectivo para usted

## 2022-06-29 NOTE — ASSESSMENT & PLAN NOTE
DEXA scan completed 06/16/2022 findings- osteoporosis  Denies any history of fractures  With shared decision making patient opted to start Boniva 150 mg monthly  Start calcium with vitamin-D 600 mg/200 units once daily    Encouraged to continue eating foods enrich in calcium such as beans, spinach, yogurt, milk, cheese, nuts, salmon/sardines etc  Discussed smoking cessation, decreasing alcohol consumption, and increasing physical activity as tolerated  Repeat DEXA in 18 months- 2yrs

## 2022-06-29 NOTE — PROGRESS NOTES
Assessment/Plan:    Age-related osteoporosis without current pathological fracture  DEXA scan completed 06/16/2022 findings- osteoporosis  Denies any history of fractures  With shared decision making patient opted to start Boniva 150 mg monthly  Start calcium with vitamin-D 600 mg/200 units once daily  Encouraged to continue eating foods enrich in calcium such as beans, spinach, yogurt, milk, cheese, nuts, salmon/sardines etc  Discussed smoking cessation, decreasing alcohol consumption, and increasing physical activity as tolerated  Repeat DEXA in 18 months- 2yrs    Screening mammogram, encounter for  Order provided for screening mammogram      Diagnoses and all orders for this visit:    Age-related osteoporosis without current pathological fracture  -     ibandronate (BONIVA) 150 MG tablet; Take 1 tablet (150 mg total) by mouth every 30 (thirty) days Give with water 60 mins before 1st food/drink/med, avoid lying down x 60 min  -     Calcium Carbonate+Vitamin D (Calcium 600 + D) 600-200 MG-UNIT TABS; Take 1 tablet by mouth in the morning    Screening mammogram, encounter for  -     Mammo screening bilateral w 3d & cad; Future      Subjective:      Patient ID: Esther Sheehan is a 64 y o  female presents today to discuss DEXA scan results and is due for mammogram     The following portions of the patient's history were reviewed and updated as appropriate: allergies, current medications, past family history, past medical history, past social history, past surgical history and problem list     Review of Systems   Respiratory: Negative  Cardiovascular: Negative  Musculoskeletal: Negative            Objective:      /67 (BP Location: Right arm, Patient Position: Sitting, Cuff Size: Standard)   Pulse 75   Temp (!) 97 °F (36 1 °C) (Temporal)   Resp 18   Ht 5' 6" (1 676 m)   Wt 71 kg (156 lb 9 6 oz)   SpO2 97%   BMI 25 28 kg/m²          Physical Exam  Constitutional:       General: She is not in acute distress  Appearance: Normal appearance  She is not ill-appearing  Pulmonary:      Effort: Pulmonary effort is normal  No respiratory distress  Neurological:      Mental Status: She is alert and oriented to person, place, and time     Psychiatric:         Mood and Affect: Mood normal          Behavior: Behavior normal

## 2022-07-06 NOTE — PROGRESS NOTES
Subjective:     Michael Mcintosh is a 64 y o   female who presents for pessary check, cleaning and change  Pessary was last cleaned in April  She denies any vaginal bleeding or pain  Pessary has remained in place without difficulty with no pressure symptoms  Objective:    Vitals: Blood pressure 118/82, pulse 80, weight 70 8 kg (156 lb)  Body mass index is 25 18 kg/m²  Physical Exam  Exam conducted with a chaperone present  Genitourinary:     Labia:         Right: No rash, tenderness or lesion  Left: No rash, tenderness or lesion  Vagina: No vaginal discharge, erythema, tenderness or bleeding  Pessary    Date/Time: 2022 9:48 AM  Performed by: Wyline Phalen, MD  Authorized by: Wyline Phalen, MD   Universal Protocol:  Consent: Verbal consent obtained  Risks and benefits: risks, benefits and alternatives were discussed  Consent given by: patient  Patient understanding: patient states understanding of the procedure being performed  Patient consent: the patient's understanding of the procedure matches consent given  Procedure consent: procedure consent matches procedure scheduled  Patient identity confirmed: verbally with patient      Indication:     Indication for pessary: cystocele and rectocele    Pre-procedure:     Pessary procedure type:  Cleaning/check  Problems:     Pessary complications: none    Procedure:     Pessary type:  Ring    Patient tolerance of procedure: Tolerated well, no immediate complications          Assessment/Plan:  Pessary maintenance  -Return in 3 months for cleaning and check  -Call office if pessary fall out, or bleeding/discomfort occurs          Wyline Phalen, MD  2022  9:31 AM

## 2022-07-07 ENCOUNTER — OFFICE VISIT (OUTPATIENT)
Dept: OBGYN CLINIC | Facility: CLINIC | Age: 62
End: 2022-07-07

## 2022-07-07 VITALS
BODY MASS INDEX: 25.18 KG/M2 | DIASTOLIC BLOOD PRESSURE: 82 MMHG | HEART RATE: 80 BPM | SYSTOLIC BLOOD PRESSURE: 118 MMHG | WEIGHT: 156 LBS

## 2022-07-07 DIAGNOSIS — Z46.89 PESSARY MAINTENANCE: Primary | ICD-10-CM

## 2022-07-07 PROCEDURE — A4561 PESSARY RUBBER, ANY TYPE: HCPCS | Performed by: OBSTETRICS & GYNECOLOGY

## 2022-07-07 PROCEDURE — 99213 OFFICE O/P EST LOW 20 MIN: CPT | Performed by: OBSTETRICS & GYNECOLOGY

## 2022-08-04 ENCOUNTER — HOSPITAL ENCOUNTER (OUTPATIENT)
Dept: RADIOLOGY | Age: 62
Discharge: HOME | End: 2022-08-04
Payer: MEDICARE

## 2022-08-04 VITALS — WEIGHT: 157 LBS | HEIGHT: 65 IN | BODY MASS INDEX: 26.16 KG/M2

## 2022-08-04 DIAGNOSIS — Z12.31 SCREENING MAMMOGRAM, ENCOUNTER FOR: ICD-10-CM

## 2022-08-04 PROCEDURE — 77067 SCR MAMMO BI INCL CAD: CPT

## 2022-08-04 PROCEDURE — 77063 BREAST TOMOSYNTHESIS BI: CPT

## 2022-09-07 ENCOUNTER — HOSPITAL ENCOUNTER (OUTPATIENT)
Dept: MAMMOGRAPHY | Facility: CLINIC | Age: 62
Discharge: HOME | End: 2022-09-07
Payer: MEDICARE

## 2022-09-07 ENCOUNTER — HOSPITAL ENCOUNTER (OUTPATIENT)
Dept: ULTRASOUND IMAGING | Facility: CLINIC | Age: 62
Discharge: HOME | End: 2022-09-07
Payer: MEDICARE

## 2022-09-07 VITALS — BODY MASS INDEX: 26.16 KG/M2 | HEIGHT: 65 IN | WEIGHT: 157 LBS

## 2022-09-07 DIAGNOSIS — R92.8 ABNORMAL SCREENING MAMMOGRAM: ICD-10-CM

## 2022-09-07 PROCEDURE — 76642 ULTRASOUND BREAST LIMITED: CPT

## 2022-09-07 PROCEDURE — 77065 DX MAMMO INCL CAD UNI: CPT

## 2022-09-07 PROCEDURE — G0279 TOMOSYNTHESIS, MAMMO: HCPCS

## 2022-09-12 ENCOUNTER — APPOINTMENT (OUTPATIENT)
Dept: URGENT CARE | Age: 62
End: 2022-09-12

## 2022-09-26 ENCOUNTER — OFFICE VISIT (OUTPATIENT)
Dept: FAMILY MEDICINE CLINIC | Facility: CLINIC | Age: 62
End: 2022-09-26

## 2022-09-26 VITALS
HEART RATE: 79 BPM | WEIGHT: 158 LBS | RESPIRATION RATE: 18 BRPM | OXYGEN SATURATION: 98 % | DIASTOLIC BLOOD PRESSURE: 78 MMHG | SYSTOLIC BLOOD PRESSURE: 118 MMHG | TEMPERATURE: 97.5 F | HEIGHT: 65 IN | BODY MASS INDEX: 26.33 KG/M2

## 2022-09-26 DIAGNOSIS — M81.0 AGE-RELATED OSTEOPOROSIS WITHOUT CURRENT PATHOLOGICAL FRACTURE: ICD-10-CM

## 2022-09-26 DIAGNOSIS — Z13.6 SCREENING FOR CARDIOVASCULAR CONDITION: ICD-10-CM

## 2022-09-26 DIAGNOSIS — F17.200 TOBACCO DEPENDENCE: ICD-10-CM

## 2022-09-26 DIAGNOSIS — M15.9 PRIMARY OSTEOARTHRITIS INVOLVING MULTIPLE JOINTS: Primary | ICD-10-CM

## 2022-09-26 DIAGNOSIS — M70.61 TROCHANTERIC BURSITIS OF RIGHT HIP: ICD-10-CM

## 2022-09-26 PROCEDURE — 99214 OFFICE O/P EST MOD 30 MIN: CPT | Performed by: FAMILY MEDICINE

## 2022-09-26 RX ORDER — NICOTINE 21 MG/24HR
1 PATCH, TRANSDERMAL 24 HOURS TRANSDERMAL EVERY 24 HOURS
Qty: 28 PATCH | Refills: 0 | Status: SHIPPED | OUTPATIENT
Start: 2022-09-26

## 2022-09-26 RX ORDER — NAPROXEN 500 MG/1
500 TABLET ORAL 2 TIMES DAILY WITH MEALS
Qty: 15 TABLET | Refills: 0 | Status: SHIPPED | OUTPATIENT
Start: 2022-09-26

## 2022-09-26 NOTE — ASSESSMENT & PLAN NOTE
Discussed use of NSAIDs, ice/heat therapy, activity modification  Avoid laying on right side   Consider imaging and PT if above provides no symptomatic at f/u and consider other treatment modalities pending imaging results

## 2022-09-26 NOTE — ASSESSMENT & PLAN NOTE
Diagnosed in 06/2022  Started on Boniva, patient reports she takes Boniva daily  Called pt's pharmacy in her presence and clarified she picked up only 3 pills (150 mg 1 tab monthly x3)  Continue daily Vitamin D and Calcium  Discussed smoking cessation and weight bearing exercises   Repeat DExa in 2 years

## 2022-09-26 NOTE — ASSESSMENT & PLAN NOTE
Tobacco Cessation Counseling: Tobacco cessation counseling and education was provided  The patient is sincerely urged to quit consumption of tobacco  She is somewhat ready to quit tobacco  The numerous health risks of tobacco consumption were discussed  Prescribed the following medications: nicotine patch

## 2022-09-26 NOTE — PROGRESS NOTES
Name: Erin Rao      : 1960      MRN: [id number]  Encounter Provider: Carolann Bailey MD  Encounter Date: 2022   Encounter department: [address] [place] [address]  Primary osteoarthritis involving multiple joints  Assessment & Plan:  Continue use of Tylenol  Will trial short course of NSAIDs in the setting of Trochanteric bursitis  Continue weight bearing exercises      Orders:  -     naproxen (Naprosyn) 500 mg tablet; Take 1 tablet (500 mg total) by mouth 2 (two) times a day with meals    2  Screening for cardiovascular condition  -     Hemoglobin A1C; Future  -     Comprehensive metabolic panel; Future  -     Lipid panel; Future  -     CBC and differential; Future    3  Tobacco dependence  Assessment & Plan:  Tobacco Cessation Counseling: Tobacco cessation counseling and education was provided  The patient is sincerely urged to quit consumption of tobacco  She is somewhat ready to quit tobacco  The numerous health risks of tobacco consumption were discussed  Prescribed the following medications: nicotine patch  Orders:  -     nicotine (NICODERM CQ) 14 mg/24hr TD 24 hr patch; Place 1 patch on the skin every 24 hours    4  Trochanteric bursitis of right hip  Assessment & Plan:  Discussed use of NSAIDs, ice/heat therapy, activity modification  Avoid laying on right side   Consider imaging and PT if above provides no symptomatic at f/u and consider other treatment modalities pending imaging results      5  Age-related osteoporosis without current pathological fracture  Assessment & Plan:  Diagnosed in 2022  Started on Boniva, patient reports she takes Boniva daily  Called pt's pharmacy in her presence and clarified she picked up only 3 pills (150 mg 1 tab monthly x3)  Continue daily Vitamin D and Calcium  Discussed smoking cessation and weight bearing exercises   Repeat DExa in 2 years        BMI Counseling: Body mass index is 26 29 kg/m²  The BMI is above normal  Nutrition recommendations include reducing portion sizes, decreasing overall calorie intake, 3-5 servings of fruits/vegetables daily and consuming healthier snacks  Exercise recommendations include exercising 3-5 times per week  Subjective     HPI   Pt here for follow up  Reports multiple complaints  Right hip pain- onset of one week, denies any trauma or falls  Works as a  at [employer].  Ice is provioding relief  Denies numbness, tingling  Has a pessary in place and felt yesterday that it was lower in the vagina  Pt self pushed up the pessary and has not felt anymore discomfort  She has a f/u appt on 10/10/22 with her urogyn provider,  Pt also reports pain from callus formed at the bhatia aspect left thumb from constant use of mop used at work  Patient is smoking 5-8 cigs/day  Reports is requesting nicotine patches  Review of Systems   Musculoskeletal: Positive for arthralgias (right lateral hip pain)         Past Medical History:   Diagnosis Date    Inflammation of bladder      Past Surgical History:   Procedure Laterality Date     SECTION      3 times    COLONOSCOPY      UPPER GASTROINTESTINAL ENDOSCOPY       Family History   Problem Relation Age of Onset    Prostate cancer Father [de-identified]    No Known Problems Mother     No Known Problems Sister     No Known Problems Daughter     No Known Problems Maternal Grandmother     No Known Problems Maternal Grandfather     No Known Problems Paternal Grandmother     No Known Problems Paternal Grandfather     No Known Problems Sister     No Known Problems Sister     No Known Problems Daughter     No Known Problems Daughter     No Known Problems Maternal Aunt     No Known Problems Maternal Aunt     No Known Problems Maternal Aunt     No Known Problems Maternal Aunt     No Known Problems Maternal Aunt     No Known Problems Paternal Aunt     No Known Problems Paternal Aunt      Social History     Socioeconomic History    Marital status: Single     Spouse name: None    Number of children: None    Years of education: None    Highest education level: None   Occupational History    None   Tobacco Use    Smoking status: Current Every Day Smoker     Packs/day: 1 00     Years: 40 00     Pack years: 40 00     Types: Cigarettes    Smokeless tobacco: Never Used   Vaping Use    Vaping Use: Never used   Substance and Sexual Activity    Alcohol use: Never    Drug use: Never    Sexual activity: Not Currently     Partners: Male   Other Topics Concern    None   Social History Narrative    None     Social Determinants of Health     Financial Resource Strain: Low Risk     Difficulty of Paying Living Expenses: Not hard at all   Food Insecurity: No Food Insecurity    Worried About Running Out of Food in the Last Year: Never true    Robert of Food in the Last Year: Never true   Transportation Needs: No Transportation Needs    Lack of Transportation (Medical): No    Lack of Transportation (Non-Medical): No   Physical Activity: Not on file   Stress: Stress Concern Present    Feeling of Stress :  To some extent   Social Connections: Not on file   Intimate Partner Violence: Not At Risk    Fear of Current or Ex-Partner: No    Emotionally Abused: No    Physically Abused: No    Sexually Abused: No   Housing Stability: Low Risk     Unable to Pay for Housing in the Last Year: No    Number of Places Lived in the Last Year: 1    Unstable Housing in the Last Year: No     Current Outpatient Medications on File Prior to Visit   Medication Sig    Calcium Carbonate+Vitamin D (Calcium 600 + D) 600-200 MG-UNIT TABS Take 1 tablet by mouth in the morning    estradiol (ESTRACE VAGINAL) 0 1 mg/g vaginal cream Insert 2 g into the vagina 2 (two) times a week    famotidine (PEPCID) 20 mg tablet Take 1 tablet (20 mg total) by mouth 2 (two) times a day    ibandronate (BONIVA) 150 MG tablet Take 1 tablet (150 mg total) by mouth every 30 (thirty) days Give with water 60 mins before 1st food/drink/med, avoid lying down x 60 min    pantoprazole (PROTONIX) 40 mg tablet Take 1 tablet (40 mg total) by mouth daily    diclofenac sodium (VOLTAREN) 1 % Apply 2 g topically 4 (four) times a day (Patient not taking: No sig reported)    [DISCONTINUED] nicotine (NICODERM CQ) 7 mg/24hr TD 24 hr patch Place 1 patch on the skin every 24 hours (Patient not taking: No sig reported)     Allergies   Allergen Reactions    Penicillins Rash     Will get urine infections     Immunization History   Administered Date(s) Administered    COVID-19 PFIZER VACCINE 0 3 ML IM 03/28/2021, 04/18/2021, 11/30/2021, 11/30/2021    Influenza, recombinant, quadrivalent,injectable, preservative free 12/13/2021    Tdap 03/07/2022       Objective     /78 (BP Location: Left arm, Patient Position: Sitting, Cuff Size: Standard)   Pulse 79   Temp 97 5 °F (36 4 °C) (Temporal)   Resp 18   Ht 5' 5" (1 651 m)   Wt 71 7 kg (158 lb)   SpO2 98%   BMI 26 29 kg/m²     Physical Exam  Constitutional:       Appearance: Normal appearance  HENT:      Head: Normocephalic and atraumatic  Eyes:      Extraocular Movements: Extraocular movements intact  Cardiovascular:      Rate and Rhythm: Normal rate  Pulmonary:      Effort: No respiratory distress  Musculoskeletal:      Right lower leg: No edema  Left lower leg: No edema        Comments: TTP right trochanteric area       Cristi uLo MD

## 2022-09-26 NOTE — ASSESSMENT & PLAN NOTE
Continue use of Tylenol  Will trial short course of NSAIDs in the setting of Trochanteric bursitis  Continue weight bearing exercises

## 2022-10-04 DIAGNOSIS — M81.0 AGE-RELATED OSTEOPOROSIS WITHOUT CURRENT PATHOLOGICAL FRACTURE: ICD-10-CM

## 2022-10-04 NOTE — TELEPHONE ENCOUNTER
Patient called for medication refill for  -Ibandronate 150 mg tab  Please send medication to  -[facility]       Thanks

## 2022-10-05 RX ORDER — IBANDRONATE SODIUM 150 MG/1
150 TABLET, FILM COATED ORAL
Qty: 3 TABLET | Refills: 0 | Status: SHIPPED | OUTPATIENT
Start: 2022-10-05

## 2022-10-10 ENCOUNTER — OFFICE VISIT (OUTPATIENT)
Dept: OBGYN CLINIC | Facility: CLINIC | Age: 62
End: 2022-10-10

## 2022-10-10 VITALS
SYSTOLIC BLOOD PRESSURE: 103 MMHG | BODY MASS INDEX: 25.96 KG/M2 | WEIGHT: 156 LBS | DIASTOLIC BLOOD PRESSURE: 70 MMHG | HEART RATE: 85 BPM

## 2022-10-10 DIAGNOSIS — Z46.89 PESSARY MAINTENANCE: Primary | ICD-10-CM

## 2022-10-10 PROCEDURE — A4561 PESSARY RUBBER, ANY TYPE: HCPCS | Performed by: OBSTETRICS & GYNECOLOGY

## 2022-10-10 PROCEDURE — 99213 OFFICE O/P EST LOW 20 MIN: CPT | Performed by: OBSTETRICS & GYNECOLOGY

## 2022-10-10 NOTE — PROGRESS NOTES
Assessment/Plan:     No problem-specific Assessment & Plan notes found for this encounter  Diagnoses and all orders for this visit:    Pessary maintenance      RTO in 3 months for pessary maintenance  Subjective:      Patient ID: Manuel John is a 58 y o  female who presents for pessary cleaning  She reports her #3 ring pessary has fallen down and she had to push it back up  She reports no other issues  She denies vaginal bleeding  We discussed placing a new larger size  HPI    The following portions of the patient's history were reviewed and updated as appropriate: allergies, current medications, past family history, past medical history, past social history, past surgical history and problem list     Review of Systems      Objective:      /70   Pulse 85   Wt 70 8 kg (156 lb)   BMI 25 96 kg/m²          Physical Exam  Vitals and nursing note reviewed  Constitutional:       Appearance: Normal appearance  Pulmonary:      Effort: Pulmonary effort is normal    Genitourinary:     Labia:         Right: No rash, tenderness, lesion or injury  Left: No rash, tenderness, lesion or injury  Vagina: Normal       Comments: #3 ring removed  #4 ring placed without difficulty  Neurological:      General: No focal deficit present  Mental Status: She is alert and oriented to person, place, and time     Psychiatric:         Mood and Affect: Mood normal          Behavior: Behavior normal

## 2022-10-24 DIAGNOSIS — M81.0 AGE-RELATED OSTEOPOROSIS WITHOUT CURRENT PATHOLOGICAL FRACTURE: ICD-10-CM

## 2022-12-05 LAB
ALBUMIN SERPL-MCNC: 4 G/DL (ref 3.6–5.1)
ALBUMIN/GLOB SERPL: 1.6 (CALC) (ref 1–2.5)
ALP SERPL-CCNC: 58 U/L (ref 37–153)
ALT SERPL-CCNC: 12 U/L (ref 6–29)
AST SERPL-CCNC: 16 U/L (ref 10–35)
BASOPHILS # BLD AUTO: 60 CELLS/UL (ref 0–200)
BASOPHILS NFR BLD AUTO: 1 %
BILIRUB SERPL-MCNC: 0.6 MG/DL (ref 0.2–1.2)
BUN SERPL-MCNC: 20 MG/DL (ref 7–25)
BUN/CREAT SERPL: NORMAL (CALC) (ref 6–22)
CALCIUM SERPL-MCNC: 9.2 MG/DL (ref 8.6–10.4)
CHLORIDE SERPL-SCNC: 107 MMOL/L (ref 98–110)
CHOLEST SERPL-MCNC: 152 MG/DL
CHOLEST/HDLC SERPL: 3.3 (CALC)
CO2 SERPL-SCNC: 29 MMOL/L (ref 20–32)
CREAT SERPL-MCNC: 0.51 MG/DL (ref 0.5–1.05)
EOSINOPHIL # BLD AUTO: 150 CELLS/UL (ref 15–500)
EOSINOPHIL NFR BLD AUTO: 2.5 %
ERYTHROCYTE [DISTWIDTH] IN BLOOD BY AUTOMATED COUNT: 11.7 % (ref 11–15)
GFR/BSA.PRED SERPLBLD CYS-BASED-ARV: 105 ML/MIN/1.73M2
GLOBULIN SER CALC-MCNC: 2.5 G/DL (CALC) (ref 1.9–3.7)
GLUCOSE SERPL-MCNC: 86 MG/DL (ref 65–99)
HBA1C MFR BLD: 5.3 % OF TOTAL HGB
HCT VFR BLD AUTO: 36.4 % (ref 35–45)
HDLC SERPL-MCNC: 46 MG/DL
HGB BLD-MCNC: 12.4 G/DL (ref 11.7–15.5)
LDLC SERPL CALC-MCNC: 83 MG/DL (CALC)
LYMPHOCYTES # BLD AUTO: 1800 CELLS/UL (ref 850–3900)
LYMPHOCYTES NFR BLD AUTO: 30 %
MCH RBC QN AUTO: 30.8 PG (ref 27–33)
MCHC RBC AUTO-ENTMCNC: 34.1 G/DL (ref 32–36)
MCV RBC AUTO: 90.5 FL (ref 80–100)
MONOCYTES # BLD AUTO: 384 CELLS/UL (ref 200–950)
MONOCYTES NFR BLD AUTO: 6.4 %
NEUTROPHILS # BLD AUTO: 3606 CELLS/UL (ref 1500–7800)
NEUTROPHILS NFR BLD AUTO: 60.1 %
NONHDLC SERPL-MCNC: 106 MG/DL (CALC)
PLATELET # BLD AUTO: 227 THOUSAND/UL (ref 140–400)
PMV BLD REES-ECKER: 11.1 FL (ref 7.5–12.5)
POTASSIUM SERPL-SCNC: 4 MMOL/L (ref 3.5–5.3)
PROT SERPL-MCNC: 6.5 G/DL (ref 6.1–8.1)
RBC # BLD AUTO: 4.02 MILLION/UL (ref 3.8–5.1)
SODIUM SERPL-SCNC: 141 MMOL/L (ref 135–146)
TRIGL SERPL-MCNC: 135 MG/DL
WBC # BLD AUTO: 6 THOUSAND/UL (ref 3.8–10.8)

## 2022-12-12 ENCOUNTER — OFFICE VISIT (OUTPATIENT)
Dept: FAMILY MEDICINE CLINIC | Facility: CLINIC | Age: 62
End: 2022-12-12

## 2022-12-12 VITALS
TEMPERATURE: 98 F | BODY MASS INDEX: 25.69 KG/M2 | OXYGEN SATURATION: 97 % | DIASTOLIC BLOOD PRESSURE: 81 MMHG | HEART RATE: 80 BPM | SYSTOLIC BLOOD PRESSURE: 121 MMHG | WEIGHT: 154.4 LBS

## 2022-12-12 DIAGNOSIS — Z23 NEED FOR INFLUENZA VACCINATION: ICD-10-CM

## 2022-12-12 DIAGNOSIS — M79.621 PAIN IN RIGHT UPPER ARM: Primary | ICD-10-CM

## 2022-12-12 DIAGNOSIS — Z23 NEED FOR PNEUMOCOCCAL VACCINATION: ICD-10-CM

## 2022-12-12 DIAGNOSIS — M81.0 AGE-RELATED OSTEOPOROSIS WITHOUT CURRENT PATHOLOGICAL FRACTURE: ICD-10-CM

## 2022-12-12 RX ORDER — IBANDRONATE SODIUM 150 MG/1
150 TABLET, FILM COATED ORAL
Qty: 3 TABLET | Refills: 0 | Status: SHIPPED | OUTPATIENT
Start: 2022-12-12

## 2022-12-12 NOTE — ASSESSMENT & PLAN NOTE
Status post injury 3 weeks ago  Denies numbness tingling in the right upper extremity  Range of motion intact, neurovascular exam intact  Continue as needed NSAIDs and Voltaren gel  Follow-up to assess progress

## 2022-12-12 NOTE — PROGRESS NOTES
Name: Lien Eagle      : 1960      MRN: [id number]  Encounter Provider: Carleen Napier MD  Encounter Date: 2022   Encounter department: [address] [place] [address]     1  Pain in right upper arm  Assessment & Plan:  Status post injury 3 weeks ago  Denies numbness tingling in the right upper extremity  Range of motion intact, neurovascular exam intact  Continue as needed NSAIDs and Voltaren gel  Follow-up to assess progress    Orders:  -     Diclofenac Sodium (VOLTAREN) 1 %; Apply 2 g topically 4 (four) times a day    2  Need for pneumococcal vaccination  -     Pneumococcal Conjugate Vaccine 20-valent (Pcv20)    3  Need for influenza vaccination  -     influenza vaccine, quadrivalent, recombinant, PF, 0 5 mL, for patients 18 yr+ (FLUBLOK)    4  Age-related osteoporosis without current pathological fracture  Assessment & Plan:  Refill on Boniva provided to patient at her request      Osteoporosis diagnosed in 2022  Continue daily Vitamin D and Calcium once monthly Boniva  Discussed smoking cessation and weight bearing exercises   Repeat DExa in 2 years- due     Orders:  -     ibandronate (BONIVA) 150 MG tablet; Take 1 tablet (150 mg total) by mouth every 30 (thirty) days Give with water 60 mins before 1st food/drink/med, avoid lying down x 60 min         Subjective     HPI   Pt here for follow up  Reports she fell 3x since I last saw her  Last fall was about 3weeks ago  Her clothes got caught by the hinge of  the door and she fell down 2 steps hitting her right lateral humerus  Reports 8/10 achy pain, Advil provides moderate relief, was not evaluated in the UC/ED  Denies weakness in the RUE  Does sleep on that side so it is bothersome    Patient Was looking forward to her vacation starting Monday to        Review of Systems    Past Medical History:   Diagnosis Date   • Inflammation of bladder      Past Surgical History:   Procedure Laterality Date   •  SECTION      3 times   • COLONOSCOPY     • UPPER GASTROINTESTINAL ENDOSCOPY       Family History   Problem Relation Age of Onset   • Prostate cancer Father [de-identified]   • No Known Problems Mother    • No Known Problems Sister    • No Known Problems Daughter    • No Known Problems Maternal Grandmother    • No Known Problems Maternal Grandfather    • No Known Problems Paternal Grandmother    • No Known Problems Paternal Grandfather    • No Known Problems Sister    • No Known Problems Sister    • No Known Problems Daughter    • No Known Problems Daughter    • No Known Problems Maternal Aunt    • No Known Problems Maternal Aunt    • No Known Problems Maternal Aunt    • No Known Problems Maternal Aunt    • No Known Problems Maternal Aunt    • No Known Problems Paternal Aunt    • No Known Problems Paternal Aunt      Social History     Socioeconomic History   • Marital status: Single     Spouse name: None   • Number of children: None   • Years of education: None   • Highest education level: None   Occupational History   • None   Tobacco Use   • Smoking status: Every Day     Packs/day: 1 00     Years: 40 00     Pack years: 40 00     Types: Cigarettes   • Smokeless tobacco: Never   Vaping Use   • Vaping Use: Never used   Substance and Sexual Activity   • Alcohol use: Never   • Drug use: Never   • Sexual activity: Not Currently     Partners: Male   Other Topics Concern   • None   Social History Narrative   • None     Social Determinants of Health     Financial Resource Strain: Low Risk    • Difficulty of Paying Living Expenses: Not hard at all   Food Insecurity: No Food Insecurity   • Worried About Running Out of Food in the Last Year: Never true   • Ran Out of Food in the Last Year: Never true   Transportation Needs: No Transportation Needs   • Lack of Transportation (Medical): No   • Lack of Transportation (Non-Medical):  No   Physical Activity: Not on file   Stress: Stress Concern Present   • Feeling of Stress :  To some extent   Social Connections: Not on file   Intimate Partner Violence: Not At Risk   • Fear of Current or Ex-Partner: No   • Emotionally Abused: No   • Physically Abused: No   • Sexually Abused: No   Housing Stability: Low Risk    • Unable to Pay for Housing in the Last Year: No   • Number of Places Lived in the Last Year: 1   • Unstable Housing in the Last Year: No     Current Outpatient Medications on File Prior to Visit   Medication Sig   • Calcium Carbonate+Vitamin D (Calcium 600 + D) 600-200 MG-UNIT TABS Take 1 tablet by mouth in the morning   • estradiol (ESTRACE VAGINAL) 0 1 mg/g vaginal cream Insert 2 g into the vagina 2 (two) times a week   • famotidine (PEPCID) 20 mg tablet Take 1 tablet (20 mg total) by mouth 2 (two) times a day   • naproxen (Naprosyn) 500 mg tablet Take 1 tablet (500 mg total) by mouth 2 (two) times a day with meals   • nicotine (NICODERM CQ) 14 mg/24hr TD 24 hr patch Place 1 patch on the skin every 24 hours   • pantoprazole (PROTONIX) 40 mg tablet Take 1 tablet (40 mg total) by mouth daily   • [DISCONTINUED] ibandronate (BONIVA) 150 MG tablet Take 1 tablet (150 mg total) by mouth every 30 (thirty) days Give with water 60 mins before 1st food/drink/med, avoid lying down x 60 min   • [DISCONTINUED] diclofenac sodium (VOLTAREN) 1 % Apply 2 g topically 4 (four) times a day (Patient not taking: Reported on 2/11/2022)     Allergies   Allergen Reactions   • Penicillins Rash     Will get urine infections     Immunization History   Administered Date(s) Administered   • COVID-19 PFIZER VACCINE 0 3 ML IM 03/28/2021, 04/18/2021, 11/30/2021, 11/30/2021   • Influenza, recombinant, quadrivalent,injectable, preservative free 12/13/2021, 12/12/2022   • Pneumococcal Conjugate Vaccine 20-valent (Pcv20), Polysace 12/12/2022   • Tdap 03/07/2022       Objective     /81 (BP Location: Left arm, Patient Position: Sitting, Cuff Size: Large)   Pulse 80   Temp 98 °F (36 7 °C) (Temporal) Wt 70 kg (154 lb 6 4 oz)   SpO2 97%   BMI 25 69 kg/m²     Physical Exam  HENT:      Head: Normocephalic and atraumatic  Eyes:      Extraocular Movements: Extraocular movements intact  Cardiovascular:      Rate and Rhythm: Normal rate  Pulmonary:      Effort: Pulmonary effort is normal  No respiratory distress  Breath sounds: No wheezing or rales  Musculoskeletal:      Cervical back: Neck supple  Right lower leg: No edema  Left lower leg: No edema  Comments: Full range of motion right shoulder and elbow  No erythema, bruits on the right upper arm, neurovascular exam intact     Skin:     General: Skin is warm and dry  Neurological:      Mental Status: Mental status is at baseline     Psychiatric:         Mood and Affect: Mood normal        Hollis Bird MD

## 2022-12-12 NOTE — ASSESSMENT & PLAN NOTE
Refill on Boniva provided to patient at her request      Osteoporosis diagnosed in 06/2022  Continue daily Vitamin D and Calcium once monthly Remy  Discussed smoking cessation and weight bearing exercises   Repeat DExa in 2 years- due 2024

## 2023-01-05 ENCOUNTER — RA CDI HCC (OUTPATIENT)
Dept: OTHER | Facility: HOSPITAL | Age: 63
End: 2023-01-05

## 2023-01-05 NOTE — PROGRESS NOTES
Assessment/Plan:     No problem-specific Assessment & Plan notes found for this encounter  Diagnoses and all orders for this visit:    Pessary maintenance      RTO in 3 months for pessary cleaning  Subjective:      Patient ID: Boris Castrejon is a 58 y o  female who presents for pessary maintenance  She has a #4 ring with support  She offers no complaints today  She denies vaginal bleeding  HPI    The following portions of the patient's history were reviewed and updated as appropriate: allergies, current medications, past family history, past medical history, past social history, past surgical history and problem list     Review of Systems      Objective:      /76   Pulse 84   Ht 5' 5" (1 651 m)   Wt 72 kg (158 lb 12 8 oz)   BMI 26 43 kg/m²          Physical Exam  Vitals and nursing note reviewed  Exam conducted with a chaperone present  Constitutional:       Appearance: Normal appearance  Pulmonary:      Effort: Pulmonary effort is normal    Genitourinary:     Labia:         Right: No rash, tenderness, lesion or injury  Left: No rash, tenderness, lesion or injury  Vagina: Normal       Comments: No erosions  No bleeding  Neurological:      General: No focal deficit present  Mental Status: She is alert and oriented to person, place, and time     Psychiatric:         Mood and Affect: Mood normal          Behavior: Behavior normal

## 2023-01-05 NOTE — PROGRESS NOTES
[id number]      [facility] 75  coding opportunities          Chart Reviewed number of suggestions sent to Provider: 1     Patients Insurance        Commercial Insurance: [employer]

## 2023-01-10 ENCOUNTER — OFFICE VISIT (OUTPATIENT)
Dept: OBGYN CLINIC | Facility: CLINIC | Age: 63
End: 2023-01-10

## 2023-01-10 VITALS
WEIGHT: 158.8 LBS | HEIGHT: 65 IN | DIASTOLIC BLOOD PRESSURE: 76 MMHG | BODY MASS INDEX: 26.46 KG/M2 | SYSTOLIC BLOOD PRESSURE: 126 MMHG | HEART RATE: 84 BPM

## 2023-01-10 DIAGNOSIS — Z46.89 PESSARY MAINTENANCE: Primary | ICD-10-CM

## 2023-01-12 ENCOUNTER — TELEPHONE (OUTPATIENT)
Dept: FAMILY MEDICINE CLINIC | Facility: CLINIC | Age: 63
End: 2023-01-12

## 2023-01-12 DIAGNOSIS — M81.0 AGE-RELATED OSTEOPOROSIS WITHOUT CURRENT PATHOLOGICAL FRACTURE: ICD-10-CM

## 2023-01-12 DIAGNOSIS — Z01.00 EYE EXAM, ROUTINE: Primary | ICD-10-CM

## 2023-01-12 RX ORDER — IBANDRONATE SODIUM 150 MG/1
150 TABLET, FILM COATED ORAL
Qty: 3 TABLET | Refills: 0 | OUTPATIENT
Start: 2023-01-12

## 2023-01-12 NOTE — TELEPHONE ENCOUNTER
LVM for patient to schedule a follow up appointment    Remind patient to get Vitamin D lab work done prior to appointment

## 2023-01-12 NOTE — TELEPHONE ENCOUNTER
Good Afternoon Dr Esthela Vidales   Please review and place Referral for Optometrist  Providers information is on the below message   Thanks

## 2023-01-12 NOTE — TELEPHONE ENCOUNTER
Patient No Show for appointment thinking it was later      Needs a refill  ibandronate (BONIVA) 150 MG tablet     Patient can be reached at [phone]

## 2023-01-12 NOTE — TELEPHONE ENCOUNTER
Patient needs a referral for an optometrist     She would like to be referred to this doctor     Dr Duane Devries    [phone]      Insurance is requiring the referral before she can be seen by dr Tyra Whiting  [phone]

## 2023-01-12 NOTE — TELEPHONE ENCOUNTER
Good Morning Dr Lai Sites   Please review and refill if appropriate  Health pro protocols are not met also patient No Show on last appointment  Please review   Thanks

## 2023-01-12 NOTE — TELEPHONE ENCOUNTER
Patient needs an appointment  I refilled Boniva on 12/12/22 for 3 tabs  (it's a once a month medication) and does not require refill until after March 2023  Clerical team, please schedule pt for follow up      Thank you

## 2023-01-20 LAB — 25(OH)D3 SERPL-MCNC: 47 NG/ML (ref 30–100)

## 2023-01-23 ENCOUNTER — OFFICE VISIT (OUTPATIENT)
Dept: FAMILY MEDICINE CLINIC | Facility: CLINIC | Age: 63
End: 2023-01-23

## 2023-01-23 VITALS
TEMPERATURE: 97.2 F | SYSTOLIC BLOOD PRESSURE: 120 MMHG | RESPIRATION RATE: 18 BRPM | HEART RATE: 70 BPM | OXYGEN SATURATION: 99 % | WEIGHT: 153.4 LBS | DIASTOLIC BLOOD PRESSURE: 78 MMHG | BODY MASS INDEX: 25.53 KG/M2

## 2023-01-23 DIAGNOSIS — M54.30 BACK PAIN WITH SCIATICA: Primary | ICD-10-CM

## 2023-01-23 DIAGNOSIS — M54.9 BACK PAIN WITH SCIATICA: Primary | ICD-10-CM

## 2023-01-23 DIAGNOSIS — M81.0 AGE-RELATED OSTEOPOROSIS WITHOUT CURRENT PATHOLOGICAL FRACTURE: ICD-10-CM

## 2023-01-23 RX ORDER — IBANDRONATE SODIUM 150 MG/1
150 TABLET, FILM COATED ORAL
Qty: 3 TABLET | Refills: 0 | Status: SHIPPED | OUTPATIENT
Start: 2023-01-23

## 2023-01-23 RX ORDER — ASPIRIN 81 MG
1 TABLET, DELAYED RELEASE (ENTERIC COATED) ORAL DAILY
Qty: 60 TABLET | Refills: 3 | Status: SHIPPED | OUTPATIENT
Start: 2023-01-23

## 2023-01-23 NOTE — ASSESSMENT & PLAN NOTE
Acute on chronic  No motor or sensory deficits  No red flags noted ( new onset bowel or bladder incontinence, urinary retention, loss of anal sphincter tone, saddle anesthesia, h/o of metastatic cancer, or suspected spinal infection)   Patient educated ans reassurance provided    Advised to stay active and avoid bed rest which may seem counterintuitive  Initiate trial of acetaminophen, try to avoid NSAID use to hx of gastritis   Referral for PT provided  Imaging not warranted at this time

## 2023-01-23 NOTE — ASSESSMENT & PLAN NOTE
Refill on Boniva provided at patient's request    Patient has a history of osteoporosis diagnosed in June 2022  Adherence with the Boniva once monthly  We again discussed smoking cessation and weightbearing exercises  Repeat DEXA scan in 2 years-due 6/2024

## 2023-01-23 NOTE — PROGRESS NOTES
Name: Kody Lewis      : 1960      MRN: [id number]  Encounter Provider: Master Alva MD  Encounter Date: 2023   Encounter department: [address] [place] [address]  Back pain with sciatica  Assessment & Plan:  Acute on chronic  No motor or sensory deficits  No red flags noted ( new onset bowel or bladder incontinence, urinary retention, loss of anal sphincter tone, saddle anesthesia, h/o of metastatic cancer, or suspected spinal infection)   Patient educated ans reassurance provided  Advised to stay active and avoid bed rest which may seem counterintuitive  Initiate trial of acetaminophen, try to avoid NSAID use to hx of gastritis   Referral for PT provided  Imaging not warranted at this time      Orders:  -     Ambulatory Referral to Physical Therapy; Future    2  Age-related osteoporosis without current pathological fracture  Assessment & Plan:  Refill on Boniva provided at patient's request    Patient has a history of osteoporosis diagnosed in 2022  Adherence with the Boniva once monthly  We again discussed smoking cessation and weightbearing exercises  Repeat DEXA scan in 2 years-due 2024      Orders:  -     ibandronate (BONIVA) 150 MG tablet; Take 1 tablet (150 mg total) by mouth every 30 (thirty) days Give with water 60 mins before 1st food/drink/med, avoid lying down x 60 min  -     Calcium Carb-Cholecalciferol (Calcium + Vitamin D3) 600-5 MG-MCG TABS; Take 1 tablet by mouth in the morning         Subjective     Back Pain  This is a chronic problem  The current episode started in the past 7 days  The problem occurs intermittently (occuring more frequently yesterday)  The problem has been gradually worsening since onset  The pain is present in the sacro-iliac and lumbar spine  Quality: pressure like  The pain radiates to the right knee and right foot  The pain is at a severity of 8/10  The pain is severe   The pain is worse during the night  The symptoms are aggravated by standing  Stiffness is present at night  Pertinent negatives include no bladder incontinence, bowel incontinence, fever, numbness or paresthesias  Risk factors include history of osteoporosis and lack of exercise  She has tried ice, analgesics, NSAIDs and home exercises (topical analgesics) for the symptoms  The treatment provided moderate relief  Review of Systems   Constitutional: Negative for fever  Gastrointestinal: Negative for bowel incontinence  Genitourinary: Negative for bladder incontinence  Musculoskeletal: Positive for back pain  Neurological: Negative for numbness and paresthesias         Past Medical History:   Diagnosis Date   • Inflammation of bladder      Past Surgical History:   Procedure Laterality Date   •  SECTION      3 times   • COLONOSCOPY     • UPPER GASTROINTESTINAL ENDOSCOPY       Family History   Problem Relation Age of Onset   • Prostate cancer Father [de-identified]   • No Known Problems Mother    • No Known Problems Sister    • No Known Problems Daughter    • No Known Problems Maternal Grandmother    • No Known Problems Maternal Grandfather    • No Known Problems Paternal Grandmother    • No Known Problems Paternal Grandfather    • No Known Problems Sister    • No Known Problems Sister    • No Known Problems Daughter    • No Known Problems Daughter    • No Known Problems Maternal Aunt    • No Known Problems Maternal Aunt    • No Known Problems Maternal Aunt    • No Known Problems Maternal Aunt    • No Known Problems Maternal Aunt    • No Known Problems Paternal Aunt    • No Known Problems Paternal Aunt      Social History     Socioeconomic History   • Marital status: Single     Spouse name: None   • Number of children: None   • Years of education: None   • Highest education level: None   Occupational History   • None   Tobacco Use   • Smoking status: Every Day     Packs/day: 1 00     Years: 40 00     Pack years: 40 00     Types: Cigarettes   • Smokeless tobacco: Never   Vaping Use   • Vaping Use: Never used   Substance and Sexual Activity   • Alcohol use: Never   • Drug use: Never   • Sexual activity: Not Currently     Partners: Male   Other Topics Concern   • None   Social History Narrative   • None     Social Determinants of Health     Financial Resource Strain: Low Risk    • Difficulty of Paying Living Expenses: Not hard at all   Food Insecurity: No Food Insecurity   • Worried About Running Out of Food in the Last Year: Never true   • Ran Out of Food in the Last Year: Never true   Transportation Needs: No Transportation Needs   • Lack of Transportation (Medical): No   • Lack of Transportation (Non-Medical): No   Physical Activity: Not on file   Stress: Stress Concern Present   • Feeling of Stress :  To some extent   Social Connections: Not on file   Intimate Partner Violence: Not At Risk   • Fear of Current or Ex-Partner: No   • Emotionally Abused: No   • Physically Abused: No   • Sexually Abused: No   Housing Stability: Low Risk    • Unable to Pay for Housing in the Last Year: No   • Number of Places Lived in the Last Year: 1   • Unstable Housing in the Last Year: No     Current Outpatient Medications on File Prior to Visit   Medication Sig   • Diclofenac Sodium (VOLTAREN) 1 % Apply 2 g topically 4 (four) times a day   • estradiol (ESTRACE VAGINAL) 0 1 mg/g vaginal cream Insert 2 g into the vagina 2 (two) times a week   • famotidine (PEPCID) 20 mg tablet Take 1 tablet (20 mg total) by mouth 2 (two) times a day   • nicotine (NICODERM CQ) 14 mg/24hr TD 24 hr patch Place 1 patch on the skin every 24 hours   • pantoprazole (PROTONIX) 40 mg tablet Take 1 tablet (40 mg total) by mouth daily   • [DISCONTINUED] Calcium Carbonate+Vitamin D (Calcium 600 + D) 600-200 MG-UNIT TABS Take 1 tablet by mouth in the morning   • [DISCONTINUED] ibandronate (BONIVA) 150 MG tablet Take 1 tablet (150 mg total) by mouth every 30 (thirty) days Give with water 60 mins before 1st food/drink/med, avoid lying down x 60 min   • naproxen (Naprosyn) 500 mg tablet Take 1 tablet (500 mg total) by mouth 2 (two) times a day with meals (Patient not taking: Reported on 1/10/2023)     Allergies   Allergen Reactions   • Penicillins Rash     Will get urine infections     Immunization History   Administered Date(s) Administered   • COVID-19 PFIZER VACCINE 0 3 ML IM 03/28/2021, 04/18/2021, 11/30/2021, 11/30/2021   • Influenza, recombinant, quadrivalent,injectable, preservative free 12/13/2021, 12/12/2022   • Pneumococcal Conjugate Vaccine 20-valent (Pcv20), Polysace 12/12/2022   • Tdap 03/07/2022       Objective     /78 (BP Location: Left arm, Patient Position: Sitting, Cuff Size: Standard)   Pulse 70   Temp (!) 97 2 °F (36 2 °C) (Temporal)   Resp 18   Wt 69 6 kg (153 lb 6 4 oz)   SpO2 99%   BMI 25 53 kg/m²     Physical Exam  Constitutional:       Appearance: Normal appearance  HENT:      Head: Normocephalic and atraumatic  Eyes:      Extraocular Movements: Extraocular movements intact  Cardiovascular:      Rate and Rhythm: Normal rate  Pulmonary:      Effort: Pulmonary effort is normal  No respiratory distress  Breath sounds: No wheezing or rales  Musculoskeletal:      Cervical back: Neck supple  Comments: Pain with left lateral flexion   Skin:     General: Skin is warm and dry  Neurological:      General: No focal deficit present  Cranial Nerves: No cranial nerve deficit  Motor: No weakness        Gait: Gait normal    Psychiatric:         Mood and Affect: Mood normal        Shell Isbell MD

## 2023-02-03 ENCOUNTER — OFFICE VISIT (OUTPATIENT)
Dept: GASTROENTEROLOGY | Facility: CLINIC | Age: 63
End: 2023-02-03

## 2023-02-03 VITALS
TEMPERATURE: 99.3 F | WEIGHT: 158 LBS | DIASTOLIC BLOOD PRESSURE: 70 MMHG | BODY MASS INDEX: 26.33 KG/M2 | SYSTOLIC BLOOD PRESSURE: 116 MMHG | HEIGHT: 65 IN

## 2023-02-03 DIAGNOSIS — K58.0 IRRITABLE BOWEL SYNDROME WITH DIARRHEA: Primary | ICD-10-CM

## 2023-02-03 DIAGNOSIS — K29.20 CHRONIC ALCOHOLIC GASTRITIS WITHOUT HEMORRHAGE: ICD-10-CM

## 2023-02-03 RX ORDER — DICYCLOMINE HCL 20 MG
20 TABLET ORAL 2 TIMES DAILY PRN
Qty: 30 TABLET | Refills: 3 | Status: SHIPPED | OUTPATIENT
Start: 2023-02-03

## 2023-02-03 NOTE — PROGRESS NOTES
[facility] - Outpatient Follow-up Note  Hezekiah Lennox 58 y o  female MRN: [id number]  Encounter: [id number]          ASSESSMENT AND PLAN:      1  Irritable bowel syndrome with diarrhea  -She has abdominal bloating, gas and pain after eating followed by diarrhea this is consistent with irritable bowel syndrome with diarrhea  I recommend low FODMAP diet  We will as needed for crampy abdominal pain  Xifaxan for 2 weeks  - dicyclomine (BENTYL) 20 mg tablet; Take 1 tablet (20 mg total) by mouth 2 (two) times a day as needed (abdominal pain)  Dispense: 30 tablet; Refill: 3  - rifaximin (XIFAXAN) 550 mg tablet; Take 1 tablet (550 mg total) by mouth every 8 (eight) hours for 14 days  Dispense: 42 tablet; Refill: 2    2  Chronic alcoholic gastritis without hemorrhage  Continue pantoprazole and famotidine for dyspepsia  Follow-up in 6 months  ______________________________________________________________________    SUBJECTIVE: 70-year-old female here for follow-up visit for abdominal pain and diarrhea  She has been experiencing abdominal bloating, gas and diarrhea after eating  Symptoms are worse after eating fruits, vegetables, and meat  She has no symptoms after eating bread or pizza  She reports abdominal bloating and diarrhea after eating  No recent weight loss  Normal colonoscopy in   EGD with duodenal and gastric biopsy was negative for celiac disease and H  pylori infection  Small squamous papilloma removed from esophagus  No family history of GI malignancy  No prior cholecystectomy      REVIEW OF SYSTEMS IS OTHERWISE NEGATIVE        Historical Information   Past Medical History:   Diagnosis Date   • Inflammation of bladder      Past Surgical History:   Procedure Laterality Date   •  SECTION      3 times   • COLONOSCOPY     • UPPER GASTROINTESTINAL ENDOSCOPY       Social History   Social History     Substance and Sexual Activity   Alcohol Use Never     Social History Substance and Sexual Activity   Drug Use Never     Social History     Tobacco Use   Smoking Status Every Day   • Packs/day: 1 00   • Years: 40 00   • Pack years: 40 00   • Types: Cigarettes   Smokeless Tobacco Never     Family History   Problem Relation Age of Onset   • Prostate cancer Father [de-identified]   • No Known Problems Mother    • No Known Problems Sister    • No Known Problems Daughter    • No Known Problems Maternal Grandmother    • No Known Problems Maternal Grandfather    • No Known Problems Paternal Grandmother    • No Known Problems Paternal Grandfather    • No Known Problems Sister    • No Known Problems Sister    • No Known Problems Daughter    • No Known Problems Daughter    • No Known Problems Maternal Aunt    • No Known Problems Maternal Aunt    • No Known Problems Maternal Aunt    • No Known Problems Maternal Aunt    • No Known Problems Maternal Aunt    • No Known Problems Paternal Aunt    • No Known Problems Paternal Aunt        Meds/Allergies       Current Outpatient Medications:   •  Calcium Carb-Cholecalciferol (Calcium + Vitamin D3) 600-5 MG-MCG TABS  •  Diclofenac Sodium (VOLTAREN) 1 %  •  estradiol (ESTRACE VAGINAL) 0 1 mg/g vaginal cream  •  famotidine (PEPCID) 20 mg tablet  •  ibandronate (BONIVA) 150 MG tablet  •  nicotine (NICODERM CQ) 14 mg/24hr TD 24 hr patch  •  pantoprazole (PROTONIX) 40 mg tablet  •  naproxen (Naprosyn) 500 mg tablet    Allergies   Allergen Reactions   • Penicillins Rash     Will get urine infections           Objective     Blood pressure 116/70, temperature 99 3 °F (37 4 °C), temperature source Tympanic, height 5' 5" (1 651 m), weight 71 7 kg (158 lb), not currently breastfeeding  Body mass index is 26 29 kg/m²        PHYSICAL EXAM:      General Appearance:   Alert, cooperative, no distress   HEENT:   Normocephalic, atraumatic, anicteric      Neck:  Supple, symmetrical, trachea midline   Lungs:   Clear to auscultation bilaterally; no rales, rhonchi or wheezing; respirations unlabored    Heart[de-identified]   Regular rate and rhythm; no murmur, rub, or gallop  Abdomen:   Soft, non-tender, non-distended; normal bowel sounds; no masses, no organomegaly    Genitalia:   Deferred    Rectal:   Deferred    Extremities:  No cyanosis, clubbing or edema    Pulses:  2+ and symmetric    Skin:  No jaundice, rashes, or lesions    Lymph nodes:  No palpable cervical lymphadenopathy        Lab Results:   No visits with results within 1 Day(s) from this visit  Latest known visit with results is:   Orders Only on 01/20/2023   Component Date Value   • Vitamin D, 25-Hydroxy, S* 01/20/2023 47          Radiology Results:   No results found      Answers for HPI/ROS submitted by the patient on 1/27/2023  Chronicity: recurrent  Onset: more than 1 month ago  Frequency: intermittently  Pain - numeric: 5/10  anorexia: No  arthralgias: No  fever: No  flatus: Yes  myalgias: Yes

## 2023-03-06 ENCOUNTER — OFFICE VISIT (OUTPATIENT)
Dept: FAMILY MEDICINE CLINIC | Facility: CLINIC | Age: 63
End: 2023-03-06

## 2023-03-06 VITALS
OXYGEN SATURATION: 96 % | HEIGHT: 65 IN | BODY MASS INDEX: 25.96 KG/M2 | SYSTOLIC BLOOD PRESSURE: 109 MMHG | WEIGHT: 155.8 LBS | HEART RATE: 81 BPM | DIASTOLIC BLOOD PRESSURE: 76 MMHG | RESPIRATION RATE: 16 BRPM | TEMPERATURE: 98.3 F

## 2023-03-06 DIAGNOSIS — F17.200 TOBACCO DEPENDENCE: ICD-10-CM

## 2023-03-06 DIAGNOSIS — S49.91XA INJURY OF RIGHT HUMERUS: ICD-10-CM

## 2023-03-06 DIAGNOSIS — Z12.2 SCREENING FOR LUNG CANCER: ICD-10-CM

## 2023-03-06 DIAGNOSIS — F32.0 MAJOR DEPRESSIVE DISORDER, SINGLE EPISODE, MILD (HCC): ICD-10-CM

## 2023-03-06 DIAGNOSIS — K58.0 IRRITABLE BOWEL SYNDROME WITH DIARRHEA: ICD-10-CM

## 2023-03-06 DIAGNOSIS — Z00.00 ANNUAL PHYSICAL EXAM: ICD-10-CM

## 2023-03-06 DIAGNOSIS — M81.0 AGE-RELATED OSTEOPOROSIS WITHOUT CURRENT PATHOLOGICAL FRACTURE: Primary | ICD-10-CM

## 2023-03-06 RX ORDER — IBANDRONATE SODIUM 150 MG/1
150 TABLET, FILM COATED ORAL
Qty: 3 TABLET | Refills: 0 | Status: SHIPPED | OUTPATIENT
Start: 2023-03-06

## 2023-03-06 RX ORDER — DICYCLOMINE HCL 20 MG
20 TABLET ORAL 2 TIMES DAILY PRN
Qty: 30 TABLET | Refills: 3 | Status: SHIPPED | OUTPATIENT
Start: 2023-03-06

## 2023-03-06 NOTE — ASSESSMENT & PLAN NOTE
Tobacco Cessation Counseling: Tobacco cessation counseling and education was provided  The patient is sincerely urged to quit consumption of tobacco  She is not ready to quit tobacco  The numerous health risks of tobacco consumption were discussed  If she decides to quit, there are a number of helpful adjunctive aids, and she can see me to discuss nicotine replacement therapy, chantix, or bupropion anytime in the future      LDCT - annual ordered, to be completed after 05/03/2023

## 2023-03-06 NOTE — PROGRESS NOTES
[address] [facility] [place]    NAME: Vi Miles  AGE: 58 y o  SEX: female  : 1960     DATE: 3/6/2023     Assessment and Plan:     Problem List Items Addressed This Visit        Digestive    Irritable bowel syndrome with diarrhea     Follows with GI  Refill on Bentyl  provided at pt's request         Relevant Medications    dicyclomine (BENTYL) 20 mg tablet       Musculoskeletal and Integument    Age-related osteoporosis without current pathological fracture - Primary     Refill on Boniva provided at patient's request     Patient has a history of osteoporosis diagnosed in 2022  Adhers with the Boniva once monthly  Struggles with smoking cessation  Plans to start weightbearing exercises in the summer  Repeat DEXA scan in 2 years-due 2024            Relevant Medications    ibandronate (BONIVA) 150 MG tablet       Other    Annual physical exam     Immunizations  - COVID -- completed primary series  - TDaP -- completed   - Flu- completed 2022  - Zoster- will discuss at next appt       Screening   - Depression -- negative at today's encounter  - Hypertension -- negative  - HIV & Hep C -- negative 2020, low risk patient  - Colorectal cancer - completed and normal 2021, due 2031  - Lung cancer -- 2 mm nodule unchanged compared to study in 2020   - T2DM -- A1c 5 3, 2022  - Cervical cancer- Normal 2022  - Mammogram - has appt tomorrow        BMI Counseling: Body mass index is 25 61 kg/m²  The BMI is above normal  Nutrition recommendations include reducing portion sizes, decreasing overall calorie intake, 3-5 servings of fruits/vegetables daily, reducing fast food intake and consuming healthier snacks  Exercise recommendations include exercising 3-5 times per week  Tobacco dependence     Tobacco Cessation Counseling: Tobacco cessation counseling and education was provided   The patient is sincerely urged to quit consumption of tobacco  She is not ready to quit tobacco  The numerous health risks of tobacco consumption were discussed  If she decides to quit, there are a number of helpful adjunctive aids, and she can see me to discuss nicotine replacement therapy, chantix, or bupropion anytime in the future  LDCT - annual ordered, to be completed after 2023           Major depressive disorder, single episode, mild (HCC)     PHQ-2/9 Depression Screening    Little interest or pleasure in doing things: 0 - not at all  Feeling down, depressed, or hopeless: 0 - not at all  Trouble falling or staying asleep, or sleeping too much: 0 - not at all  Feeling tired or having little energy: 0 - not at all  Poor appetite or overeatin - not at all  Feeling bad about yourself - or that you are a failure or have let yourself or your family down: 0 - not at all  Trouble concentrating on things, such as reading the newspaper or watching television: 0 - not at all  Moving or speaking so slowly that other people could have noticed  Or the opposite - being so fidgety or restless that you have been moving around a lot more than usual: 0 - not at all  Thoughts that you would be better off dead, or of hurting yourself in some way: 0 - not at all  PHQ-2 Score: 0  PHQ-2 Interpretation: Negative depression screen  PHQ-9 Score: 0   PHQ-9 Interpretation: No or Minimal depression        Improved          Other Visit Diagnoses     Injury of right humerus        Relevant Orders    XR humerus right    Screening for lung cancer        Relevant Orders    CT lung screening program          Immunizations and preventive care screenings were discussed with patient today  Appropriate education was printed on patient's after visit summary  Counseling:  Dental Health: discussed importance of regular tooth brushing, flossing, and dental visits    Injury prevention: discussed safety/seat belts, safety helmets, smoke detectors, carbon dioxide detectors, and smoking near bedding or upholstery  Sexual health: discussed sexually transmitted diseases, partner selection, use of condoms, avoidance of unintended pregnancy, and contraceptive alternatives  · Exercise: the importance of regular exercise/physical activity was discussed  Recommend exercise 3-5 times per week for at least 30 minutes  Return in about 4 months (around 2023) for Next scheduled follow up  Chief Complaint:     Chief Complaint   Patient presents with   • Physical Exam      History of Present Illness:     Adult Annual Physical   Patient here for a comprehensive physical exam  The patient reports right shoulder pain s/p fall on the right arm in 2022  Patient after that incident fell on the same arm 2x in the past few months  Pt states she was decorating the door over the holidays and the door flew open and she fell fwd landing on the right shoulder  On the next incident, she fell on the stairs hitting the right shoulder against the wall  Diet and Physical Activity  · Diet/Nutrition: well balanced diet  · Exercise: no formal exercise, states she is very active at work  Depression Screening  PHQ-2/9 Depression Screening    Little interest or pleasure in doing things: 0 - not at all  Feeling down, depressed, or hopeless: 0 - not at all  Trouble falling or staying asleep, or sleeping too much: 0 - not at all  Feeling tired or having little energy: 0 - not at all  Poor appetite or overeatin - not at all  Feeling bad about yourself - or that you are a failure or have let yourself or your family down: 0 - not at all  Trouble concentrating on things, such as reading the newspaper or watching television: 0 - not at all  Moving or speaking so slowly that other people could have noticed   Or the opposite - being so fidgety or restless that you have been moving around a lot more than usual: 0 - not at all  Thoughts that you would be better off dead, or of hurting yourself in some way: 0 - not at all  PHQ-2 Score: 0  PHQ-2 Interpretation: Negative depression screen  PHQ-9 Score: 0   PHQ-9 Interpretation: No or Minimal depression        General Health  · Sleep: gets 7-8 hours of sleep on average  · Hearing: right ear discomfort, follows with ENT  · Vision: wears glasses and has f/u in   · Dental: regular dental visits and has f/u in          /GYN Health  · Patient is: postmenopausal  · Last menstrual period: in 2018  · Contraceptive method: None, pt is currently not sexually active     Review of Systems:     Review of Systems   Musculoskeletal:        Right upper arm pain s/p fall      Past Medical History:     Past Medical History:   Diagnosis Date   • Allergic    • Arthritis    • GERD (gastroesophageal reflux disease)    • Inflammation of bladder       Past Surgical History:     Past Surgical History:   Procedure Laterality Date   •  SECTION      3 times   • COLONOSCOPY     • UPPER GASTROINTESTINAL ENDOSCOPY        Social History:     Social History     Socioeconomic History   • Marital status: Single     Spouse name: Not on file   • Number of children: Not on file   • Years of education: Not on file   • Highest education level: Not on file   Occupational History   • Not on file   Tobacco Use   • Smoking status: Every Day     Packs/day: 0 50     Years: 20 00     Pack years: 10 00     Types: Cigarettes   • Smokeless tobacco: Never   Vaping Use   • Vaping Use: Never used   Substance and Sexual Activity   • Alcohol use: Not Currently   • Drug use: Never   • Sexual activity: Not Currently     Partners: Female   Other Topics Concern   • Not on file   Social History Narrative   • Not on file     Social Determinants of Health     Financial Resource Strain: Low Risk    • Difficulty of Paying Living Expenses: Not hard at all   Food Insecurity: No Food Insecurity   • Worried About Running Out of Food in the Last Year: Never true   • Ran Out of Food in the Last Year: Never true   Transportation Needs: No Transportation Needs   • Lack of Transportation (Medical): No   • Lack of Transportation (Non-Medical):  No   Physical Activity: Not on file   Stress: Not on file   Social Connections: Not on file   Intimate Partner Violence: Not At Risk   • Fear of Current or Ex-Partner: No   • Emotionally Abused: No   • Physically Abused: No   • Sexually Abused: No   Housing Stability: Low Risk    • Unable to Pay for Housing in the Last Year: No   • Number of Places Lived in the Last Year: 1   • Unstable Housing in the Last Year: No      Family History:     Family History   Problem Relation Age of Onset   • Prostate cancer Father         Katina hunt cancer   • No Known Problems Mother    • No Known Problems Sister    • No Known Problems Daughter    • No Known Problems Maternal Grandmother    • No Known Problems Maternal Grandfather    • No Known Problems Paternal Grandmother    • No Known Problems Paternal Grandfather    • No Known Problems Sister    • No Known Problems Sister    • No Known Problems Daughter    • No Known Problems Daughter    • No Known Problems Maternal Aunt    • No Known Problems Maternal Aunt    • No Known Problems Maternal Aunt    • No Known Problems Maternal Aunt    • No Known Problems Maternal Aunt    • No Known Problems Paternal Aunt    • No Known Problems Paternal Aunt       Current Medications:     Current Outpatient Medications   Medication Sig Dispense Refill   • Calcium Carb-Cholecalciferol (Calcium + Vitamin D3) 600-5 MG-MCG TABS Take 1 tablet by mouth in the morning 60 tablet 3   • Diclofenac Sodium (VOLTAREN) 1 % Apply 2 g topically 4 (four) times a day 2 g 1   • dicyclomine (BENTYL) 20 mg tablet Take 1 tablet (20 mg total) by mouth 2 (two) times a day as needed (abdominal pain) 30 tablet 3   • estradiol (ESTRACE VAGINAL) 0 1 mg/g vaginal cream Insert 2 g into the vagina 2 (two) times a week 42 5 g 1   • famotidine (PEPCID) 20 mg tablet Take 1 tablet (20 mg total) by mouth 2 (two) times a day 180 tablet 1   • ibandronate (BONIVA) 150 MG tablet Take 1 tablet (150 mg total) by mouth every 30 (thirty) days Give with water 60 mins before 1st food/drink/med, avoid lying down x 60 min 3 tablet 0   • nicotine (NICODERM CQ) 14 mg/24hr TD 24 hr patch Place 1 patch on the skin every 24 hours 28 patch 0   • pantoprazole (PROTONIX) 40 mg tablet Take 1 tablet (40 mg total) by mouth daily 30 tablet 3   • naproxen (Naprosyn) 500 mg tablet Take 1 tablet (500 mg total) by mouth 2 (two) times a day with meals (Patient not taking: Reported on 1/10/2023) 15 tablet 0     No current facility-administered medications for this visit  Allergies: Allergies   Allergen Reactions   • Penicillins Rash     Will get urine infections      Physical Exam:     /76 (BP Location: Right arm, Patient Position: Sitting, Cuff Size: Standard)   Pulse 81   Temp 98 3 °F (36 8 °C) (Temporal)   Resp 16   Ht 5' 5 4" (1 661 m)   Wt 70 7 kg (155 lb 12 8 oz)   SpO2 96%   BMI 25 61 kg/m²     Physical Exam  Constitutional:       Appearance: Normal appearance  HENT:      Head: Normocephalic and atraumatic  Right Ear: External ear normal       Left Ear: External ear normal    Eyes:      Extraocular Movements: Extraocular movements intact  Cardiovascular:      Rate and Rhythm: Normal rate  Pulmonary:      Effort: Pulmonary effort is normal  No respiratory distress  Abdominal:      General: Abdomen is flat  Bowel sounds are normal  There is no distension  Palpations: Abdomen is soft  Tenderness: There is no abdominal tenderness  Musculoskeletal:      Cervical back: Neck supple  Right lower leg: No edema  Left lower leg: No edema  Skin:     General: Skin is warm and dry  Capillary Refill: Capillary refill takes less than 2 seconds  Neurological:      Mental Status: Mental status is at baseline     Psychiatric:         Mood and Affect: Mood normal  Blayne Moe MD  [address]

## 2023-03-06 NOTE — ASSESSMENT & PLAN NOTE
Refill on Boniva provided at patient's request     Patient has a history of osteoporosis diagnosed in June 2022  Adhers with the Boniva once monthly  Struggles with smoking cessation  Plans to start weightbearing exercises in the summer  Repeat DEXA scan in 2 years-due 6/2024

## 2023-03-06 NOTE — PATIENT INSTRUCTIONS
Date of Exam: 5/10/18   Ordering Physician: DR. ROMY JASSO

Reason for Echo: CHEST PAIN, SOB, STRESS TEST--NO ISCHEMIA







M-Mode  Normal Adult Results LV Dimensions Normal Adult Results

 

 AoV Opening excursions       >1.6   LVEDD-base-    3.5-5.8  

 

Ao root dimensions     2.0-3.7   LVESD-base-    3.1-4.6  

 

L. Atrium dimensions     1.9-3.8   Post. Wall thickness    0.8-1.1  

 

IV septum (thickness)     0.7-1.2   Post. Wall excursion    0.72-1.3  

 

 Septal motion    Systolic motion   

 

 R. Ventricular cavity    1.5-2.0    LVEF      60%  

 

Paradoxical septal wall motion       



2-D: NORMAL LEFT VENTRICULAR CONTRACTILITY--RESTING AND POST EXERCISE

M-MODE:

MV: 

AV:  

TV:  

PV:  

CHAMBER SIZE: 

WALL MOTION:  NORMAL LEFT VENTRICULAR CONTRACTILITY--RESTING AND POST EXERCISE

PERICARDIUM:  

_______________________________________________________

INTERPRETATION:  

1.  NORMAL LEFT VENTRICULAR CONTRACTILITY--RESTING AND POST EXERCISE
MTDD Wellness Visit for Adults   AMBULATORY CARE:   A wellness visit  is when you see your healthcare provider to get screened for health problems  Your healthcare provider will also give you advice on how to stay healthy  Write down your questions so you remember to ask them  Ask your healthcare provider how often you should have a wellness visit  What happens at a wellness visit:  Your healthcare provider will ask about your health, and your family history of health problems  This includes high blood pressure, heart disease, and cancer  He or she will ask if you have symptoms that concern you, if you smoke, and about your mood  You may also be asked about your intake of medicines, supplements, food, and alcohol  Any of the following may be done:  • Your weight  will be checked  Your height may also be checked so your body mass index (BMI) can be calculated  Your BMI shows if you are at a healthy weight  • Your blood pressure  and heart rate will be checked  Your temperature may also be checked  • Blood and urine tests  may be done  Blood tests may be done to check your cholesterol levels  Abnormal cholesterol levels increase your risk for heart disease and stroke  You may also need a blood or urine test to check for diabetes if you are at increased risk  Urine tests may be done to look for signs of an infection or kidney disease  • A physical exam  includes checking your heartbeat and lungs with a stethoscope  Your healthcare provider may also check your skin to look for sun damage  • Screening tests  may be recommended  A screening test is done to check for diseases that may not cause symptoms  The screening tests you may need depend on your age, gender, family history, and lifestyle habits  For example, colorectal screening may be recommended if you are 48years old or older  Screening tests you need if you are a woman:   • A Pap smear  is used to screen for cervical cancer   Pap smears are usually done every 3 to 5 years depending on your age  You may need them more often if you have had abnormal Pap smear test results in the past  Ask your healthcare provider how often you should have a Pap smear  • A mammogram  is an x-ray of your breasts to screen for breast cancer  Experts recommend mammograms every 2 years starting at age 48 years  You may need a mammogram at age 52 years or younger if you have an increased risk for breast cancer  Talk to your healthcare provider about when you should start having mammograms and how often you need them  Vaccines you may need:   • Get an influenza vaccine  every year  The influenza vaccine protects you from the flu  Several types of viruses cause the flu  The viruses change over time, so new vaccines are made each year  • Get a tetanus-diphtheria (Td) booster vaccine  every 10 years  This vaccine protects you against tetanus and diphtheria  Tetanus is a severe infection that may cause painful muscle spasms and lockjaw  Diphtheria is a severe bacterial infection that causes a thick covering in the back of your mouth and throat  • Get a human papillomavirus (HPV) vaccine  if you are female and aged 23 to 32 or male 23 to 24 and never received it  This vaccine protects you from HPV infection  HPV is the most common infection spread by sexual contact  HPV may also cause vaginal, penile, and anal cancers  • Get a pneumococcal vaccine  if you are aged 72 years or older  The pneumococcal vaccine is an injection given to protect you from pneumococcal disease  Pneumococcal disease is an infection caused by pneumococcal bacteria  The infection may cause pneumonia, meningitis, or an ear infection  • Get a shingles vaccine  if you are 60 or older, even if you have had shingles before  The shingles vaccine is an injection to protect you from the varicella-zoster virus  This is the same virus that causes chickenpox   Shingles is a painful rash that develops in people who had chickenpox or have been exposed to the virus  How to eat healthy:  My Plate is a model for planning healthy meals  It shows the types and amounts of foods that should go on your plate  Fruits and vegetables make up about half of your plate, and grains and protein make up the other half  A serving of dairy is included on the side of your plate  The amount of calories and serving sizes you need depends on your age, gender, weight, and height  Examples of healthy foods are listed below:  • Eat a variety of vegetables  such as dark green, red, and orange vegetables  You can also include canned vegetables low in sodium (salt) and frozen vegetables without added butter or sauces  • Eat a variety of fresh fruits , canned fruit in 100% juice, frozen fruit, and dried fruit  • Include whole grains  At least half of the grains you eat should be whole grains  Examples include whole-wheat bread, wheat pasta, brown rice, and whole-grain cereals such as oatmeal     • Eat a variety of protein foods such as seafood (fish and shellfish), lean meat, and poultry without skin (turkey and chicken)  Examples of lean meats include pork leg, shoulder, or tenderloin, and beef round, sirloin, tenderloin, and extra lean ground beef  Other protein foods include eggs and egg substitutes, beans, peas, soy products, nuts, and seeds  • Choose low-fat dairy products such as skim or 1% milk or low-fat yogurt, cheese, and cottage cheese  • Limit unhealthy fats  such as butter, hard margarine, and shortening  Exercise:  Exercise at least 30 minutes per day on most days of the week  Some examples of exercise include walking, biking, dancing, and swimming  You can also fit in more physical activity by taking the stairs instead of the elevator or parking farther away from stores  Include muscle strengthening activities 2 days each week  Regular exercise provides many health benefits   It helps you manage your weight, and decreases your risk for type 2 diabetes, heart disease, stroke, and high blood pressure  Exercise can also help improve your mood  Ask your healthcare provider about the best exercise plan for you  General health and safety guidelines:   • Do not smoke  Nicotine and other chemicals in cigarettes and cigars can cause lung damage  Ask your healthcare provider for information if you currently smoke and need help to quit  E-cigarettes or smokeless tobacco still contain nicotine  Talk to your healthcare provider before you use these products  • Limit alcohol  A drink of alcohol is 12 ounces of beer, 5 ounces of wine, or 1½ ounces of liquor  • Lose weight, if needed  Being overweight increases your risk of certain health conditions  These include heart disease, high blood pressure, type 2 diabetes, and certain types of cancer  • Protect your skin  Do not sunbathe or use tanning beds  Use sunscreen with a SPF 15 or higher  Apply sunscreen at least 15 minutes before you go outside  Reapply sunscreen every 2 hours  Wear protective clothing, hats, and sunglasses when you are outside  • Drive safely  Always wear your seatbelt  Make sure everyone in your car wears a seatbelt  A seatbelt can save your life if you are in an accident  Do not use your cell phone when you are driving  This could distract you and cause an accident  Pull over if you need to make a call or send a text message  • Practice safe sex  Use latex condoms if are sexually active and have more than one partner  Your healthcare provider may recommend screening tests for sexually transmitted infections (STIs)  • Wear helmets, lifejackets, and protective gear  Always wear a helmet when you ride a bike or motorcycle, go skiing, or play sports that could cause a head injury  Wear protective equipment when you play sports  Wear a lifejacket when you are on a boat or doing water sports      © Copyright [employer]ative 2022 Information is for End User's use only and may not be sold, redistributed or otherwise used for commercial purposes  The above information is an  only  It is not intended as medical advice for individual conditions or treatments  Talk to your doctor, nurse or pharmacist before following any medical regimen to see if it is safe and effective for you

## 2023-03-06 NOTE — ASSESSMENT & PLAN NOTE
Immunizations  - COVID -- completed primary series  - TDaP -- completed 2022  - Flu- completed 12/2022  - Zoster- will discuss at next appt       Screening   - Depression -- negative at today's encounter  - Hypertension -- negative  - HIV & Hep C -- negative 01/2020, low risk patient  - Colorectal cancer - completed and normal 02/2021, due 02/2031  - Lung cancer -- 2 mm nodule unchanged compared to study in 05/2020   - T2DM -- A1c 5 3, 12/2022  - Cervical cancer- Normal 03/2022  - Mammogram - has appt tomorrow        BMI Counseling: Body mass index is 25 61 kg/m²  The BMI is above normal  Nutrition recommendations include reducing portion sizes, decreasing overall calorie intake, 3-5 servings of fruits/vegetables daily, reducing fast food intake and consuming healthier snacks  Exercise recommendations include exercising 3-5 times per week

## 2023-03-06 NOTE — ASSESSMENT & PLAN NOTE
PHQ-2/9 Depression Screening    Little interest or pleasure in doing things: 0 - not at all  Feeling down, depressed, or hopeless: 0 - not at all  Trouble falling or staying asleep, or sleeping too much: 0 - not at all  Feeling tired or having little energy: 0 - not at all  Poor appetite or overeatin - not at all  Feeling bad about yourself - or that you are a failure or have let yourself or your family down: 0 - not at all  Trouble concentrating on things, such as reading the newspaper or watching television: 0 - not at all  Moving or speaking so slowly that other people could have noticed   Or the opposite - being so fidgety or restless that you have been moving around a lot more than usual: 0 - not at all  Thoughts that you would be better off dead, or of hurting yourself in some way: 0 - not at all  PHQ-2 Score: 0  PHQ-2 Interpretation: Negative depression screen  PHQ-9 Score: 0   PHQ-9 Interpretation: No or Minimal depression        Improved

## 2023-03-07 ENCOUNTER — HOSPITAL ENCOUNTER (OUTPATIENT)
Dept: ULTRASOUND IMAGING | Facility: CLINIC | Age: 63
Discharge: HOME | End: 2023-03-07

## 2023-03-07 VITALS — BODY MASS INDEX: 26.46 KG/M2 | HEIGHT: 64 IN | WEIGHT: 155 LBS

## 2023-03-07 DIAGNOSIS — R92.8 MAMMOGRAM ABNORMAL: ICD-10-CM

## 2023-03-07 DIAGNOSIS — Z09 FOLLOW-UP EXAM, 3-6 MONTHS SINCE PREVIOUS EXAM: ICD-10-CM

## 2023-03-16 DIAGNOSIS — Z46.89 PESSARY MAINTENANCE: Primary | ICD-10-CM

## 2023-03-16 NOTE — PROGRESS NOTES
Assessment/Plan:     No problem-specific Assessment & Plan notes found for this encounter. Diagnoses and all orders for this visit:    Pessary maintenance              Subjective:      Patient ID: Sheila Mejia is a 58 y.o. female. She has a #4 ring with support. HPI    The following portions of the patient's history were reviewed and updated as appropriate: allergies, current medications, past family history, past medical history, past social history, past surgical history and problem list.    Review of Systems      Objective: There were no vitals taken for this visit.          Physical Exam

## 2023-03-20 ENCOUNTER — OFFICE VISIT (OUTPATIENT)
Dept: OBGYN CLINIC | Facility: CLINIC | Age: 63
End: 2023-03-20

## 2023-03-20 VITALS
BODY MASS INDEX: 27.36 KG/M2 | HEART RATE: 84 BPM | WEIGHT: 159.4 LBS | DIASTOLIC BLOOD PRESSURE: 63 MMHG | SYSTOLIC BLOOD PRESSURE: 107 MMHG

## 2023-03-20 DIAGNOSIS — Z46.89 PESSARY MAINTENANCE: Primary | ICD-10-CM

## 2023-03-20 NOTE — PROGRESS NOTES
Assessment/Plan:     No problem-specific Assessment & Plan notes found for this encounter  Diagnoses and all orders for this visit:    Pessary maintenance      RTO in 3 months for pessary cleaning  Subjective:      Patient ID: Michael Mcintosh is a 58 y o  female who presents for pessary cleaning  She has a #4 ring with support  She offers no complaints today  The pessary was cleansed with chlorhexidine and replaced  HPI    The following portions of the patient's history were reviewed and updated as appropriate: allergies, current medications, past family history, past medical history, past social history, past surgical history and problem list     Review of Systems      Objective: There were no vitals taken for this visit  Physical Exam  Vitals and nursing note reviewed  Exam conducted with a chaperone present  Constitutional:       Appearance: Normal appearance  Pulmonary:      Effort: Pulmonary effort is normal    Genitourinary:     Labia:         Right: No rash, tenderness, lesion or injury  Left: No rash, tenderness, lesion or injury  Vagina: Normal       Comments: Vagina intact  Neurological:      General: No focal deficit present  Mental Status: She is alert and oriented to person, place, and time     Psychiatric:         Mood and Affect: Mood normal          Behavior: Behavior normal

## 2023-04-26 DIAGNOSIS — F17.200 TOBACCO DEPENDENCE: ICD-10-CM

## 2023-04-26 DIAGNOSIS — M81.0 AGE-RELATED OSTEOPOROSIS WITHOUT CURRENT PATHOLOGICAL FRACTURE: ICD-10-CM

## 2023-04-26 RX ORDER — NICOTINE 21 MG/24HR
1 PATCH, TRANSDERMAL 24 HOURS TRANSDERMAL EVERY 24 HOURS
Qty: 28 PATCH | Refills: 0 | Status: SHIPPED | OUTPATIENT
Start: 2023-04-26

## 2023-04-26 RX ORDER — IBANDRONATE SODIUM 150 MG/1
150 TABLET, FILM COATED ORAL
Qty: 3 TABLET | Refills: 0 | Status: SHIPPED | OUTPATIENT
Start: 2023-04-26

## 2023-06-06 ENCOUNTER — HOSPITAL ENCOUNTER (OUTPATIENT)
Dept: RADIOLOGY | Age: 63
Discharge: HOME | End: 2023-06-06
Payer: COMMERCIAL

## 2023-06-06 ENCOUNTER — APPOINTMENT (OUTPATIENT)
Dept: RADIOLOGY | Age: 63
End: 2023-06-06
Payer: COMMERCIAL

## 2023-06-06 DIAGNOSIS — S49.91XA INJURY OF RIGHT HUMERUS: ICD-10-CM

## 2023-06-06 DIAGNOSIS — Z12.2 SCREENING FOR LUNG CANCER: ICD-10-CM

## 2023-06-06 PROCEDURE — 71271 CT THORAX LUNG CANCER SCR C-: CPT

## 2023-06-06 PROCEDURE — 73060 X-RAY EXAM OF HUMERUS: CPT

## 2023-06-07 DIAGNOSIS — K58.0 IRRITABLE BOWEL SYNDROME WITH DIARRHEA: ICD-10-CM

## 2023-06-07 RX ORDER — DICYCLOMINE HCL 20 MG
20 TABLET ORAL 2 TIMES DAILY PRN
Qty: 30 TABLET | Refills: 0 | Status: SHIPPED | OUTPATIENT
Start: 2023-06-07

## 2023-06-12 ENCOUNTER — TELEPHONE (OUTPATIENT)
Dept: FAMILY MEDICINE CLINIC | Facility: CLINIC | Age: 63
End: 2023-06-12

## 2023-06-29 ENCOUNTER — OFFICE VISIT (OUTPATIENT)
Dept: FAMILY MEDICINE CLINIC | Facility: CLINIC | Age: 63
End: 2023-06-29

## 2023-06-29 VITALS
HEART RATE: 89 BPM | OXYGEN SATURATION: 98 % | DIASTOLIC BLOOD PRESSURE: 74 MMHG | SYSTOLIC BLOOD PRESSURE: 108 MMHG | HEIGHT: 64 IN | TEMPERATURE: 98 F | WEIGHT: 164 LBS | BODY MASS INDEX: 28 KG/M2

## 2023-06-29 DIAGNOSIS — M81.0 AGE-RELATED OSTEOPOROSIS WITHOUT CURRENT PATHOLOGICAL FRACTURE: Primary | ICD-10-CM

## 2023-06-29 DIAGNOSIS — F32.0 MAJOR DEPRESSIVE DISORDER, SINGLE EPISODE, MILD (HCC): ICD-10-CM

## 2023-06-29 DIAGNOSIS — R91.1 LUNG NODULE: ICD-10-CM

## 2023-06-29 DIAGNOSIS — K58.0 IRRITABLE BOWEL SYNDROME WITH DIARRHEA: ICD-10-CM

## 2023-06-29 DIAGNOSIS — F17.200 TOBACCO DEPENDENCE: ICD-10-CM

## 2023-06-29 RX ORDER — IBANDRONATE SODIUM 150 MG/1
150 TABLET, FILM COATED ORAL
Qty: 3 TABLET | Refills: 0 | Status: SHIPPED | OUTPATIENT
Start: 2023-06-29

## 2023-06-29 RX ORDER — ASPIRIN 81 MG
1 TABLET, DELAYED RELEASE (ENTERIC COATED) ORAL DAILY
Qty: 60 TABLET | Refills: 3 | Status: SHIPPED | OUTPATIENT
Start: 2023-06-29

## 2023-06-29 RX ORDER — DICYCLOMINE HCL 20 MG
20 TABLET ORAL 2 TIMES DAILY PRN
Qty: 30 TABLET | Refills: 0 | Status: SHIPPED | OUTPATIENT
Start: 2023-06-29

## 2023-06-29 RX ORDER — NICOTINE 21 MG/24HR
1 PATCH, TRANSDERMAL 24 HOURS TRANSDERMAL EVERY 24 HOURS
Qty: 28 PATCH | Refills: 0 | Status: SHIPPED | OUTPATIENT
Start: 2023-06-29

## 2023-06-29 NOTE — ASSESSMENT & PLAN NOTE
"Hx of tobacco dependence  LDCT 06/2023 :   \"Stable 3 mm left lower lobe nodule  No suspicious lung nodules      Lung-RADS2, benign appearance or behavior  Continue annual screening with LDCT in 12 months    Smoking cessation counseling provided  "

## 2023-06-29 NOTE — ASSESSMENT & PLAN NOTE
PHQ-2/9 Depression Screening    Little interest or pleasure in doing things: 0 - not at all  Feeling down, depressed, or hopeless: 0 - not at all  Trouble falling or staying asleep, or sleeping too much: 0 - not at all  Feeling tired or having little energy: 0 - not at all  Poor appetite or overeatin - not at all  Feeling bad about yourself - or that you are a failure or have let yourself or your family down: 0 - not at all  Trouble concentrating on things, such as reading the newspaper or watching television: 0 - not at all  Moving or speaking so slowly that other people could have noticed   Or the opposite - being so fidgety or restless that you have been moving around a lot more than usual: 0 - not at all  Thoughts that you would be better off dead, or of hurting yourself in some way: 0 - not at all  PHQ-9 Score: 0   PHQ-9 Interpretation: No or Minimal depression

## 2023-06-29 NOTE — PROGRESS NOTES
Name: Favio Hebert      : 1960      MRN: [id number]  Encounter Provider: Fabby Bradford MD  Encounter Date: 2023   Encounter department: [address] [place] [address]  Age-related osteoporosis without current pathological fracture  Assessment & Plan:  Osteoporosis diagnosed in 2022  With Boniva 150 milligrams once a month  Struggles with smoking cessation  Courage weightbearing exercises  Refill on Boniva provided at patient's request   Repeat DEXA  and 2024    Orders:  -     ibandronate (BONIVA) 150 MG tablet; Take 1 tablet (150 mg total) by mouth every 30 (thirty) days Give with water 60 mins before 1st food/drink/med, avoid lying down x 60 min  -     Calcium Carb-Cholecalciferol (Calcium + Vitamin D3) 600-5 MG-MCG TABS; Take 1 tablet by mouth in the morning    2  Irritable bowel syndrome with diarrhea  Assessment & Plan:  Follows with GI   Bentyl provided at patient's request    Orders:  -     dicyclomine (BENTYL) 20 mg tablet; Take 1 tablet (20 mg total) by mouth 2 (two) times a day as needed (abdominal pain)    3  Tobacco dependence  -     nicotine (NICODERM CQ) 14 mg/24hr TD 24 hr patch; Place 1 patch on the skin over 24 hours every 24 hours    4  Major depressive disorder, single episode, mild (HCC)  Assessment & Plan:  PHQ-2/9 Depression Screening    Little interest or pleasure in doing things: 0 - not at all  Feeling down, depressed, or hopeless: 0 - not at all  Trouble falling or staying asleep, or sleeping too much: 0 - not at all  Feeling tired or having little energy: 0 - not at all  Poor appetite or overeatin - not at all  Feeling bad about yourself - or that you are a failure or have let yourself or your family down: 0 - not at all  Trouble concentrating on things, such as reading the newspaper or watching television: 0 - not at all  Moving or speaking so slowly that other people could have noticed   Or the opposite - "being so fidgety or restless that you have been moving around a lot more than usual: 0 - not at all  Thoughts that you would be better off dead, or of hurting yourself in some way: 0 - not at all  PHQ-9 Score: 0   PHQ-9 Interpretation: No or Minimal depression                5  Lung nodule  Assessment & Plan:  Hx of tobacco dependence  LDCT 2023 :   \"Stable 3 mm left lower lobe nodule  No suspicious lung nodules      Lung-RADS2, benign appearance or behavior  Continue annual screening with LDCT in 12 months  Smoking cessation counseling provided           Subjective     HPI   Here for follow-up  She is doing well  Moving to [place] to live with her dtr in July, will find a new PCP and have records transferred over  States right hip pain, on and off, though frequently improved  completed PT sessions , continues exercises at home that help a great deal   Denies any other sx  States received shingles vaccine series at [facility] DR RIGOBERTO DAVIS  However has no record available  Continues to smoke about 10 cigarettes/day  Review of Systems   Constitutional: Negative for appetite change, fatigue and fever  HENT: Negative for congestion  Respiratory: Negative for shortness of breath  Gastrointestinal: Negative for abdominal pain  Musculoskeletal: Negative for back pain         Past Medical History:   Diagnosis Date   • Allergic    • Arthritis    • GERD (gastroesophageal reflux disease)    • Inflammation of bladder    • Stomach problems      Past Surgical History:   Procedure Laterality Date   •  SECTION      3 times   • COLONOSCOPY     • UPPER GASTROINTESTINAL ENDOSCOPY       Family History   Problem Relation Age of Onset   • No Known Problems Mother    • Prostate cancer Father         Katina hunt cancer   • No Known Problems Sister    • No Known Problems Sister    • No Known Problems Sister    • No Known Problems Maternal Aunt    • No Known Problems Maternal Aunt    • No Known Problems Maternal " Aunt    • No Known Problems Maternal Aunt    • No Known Problems Maternal Aunt    • No Known Problems Paternal Aunt    • No Known Problems Paternal Aunt    • No Known Problems Maternal Grandmother    • No Known Problems Maternal Grandfather    • No Known Problems Paternal Grandmother    • No Known Problems Paternal Grandfather    • No Known Problems Daughter    • No Known Problems Daughter    • No Known Problems Daughter    • Arthritis Family      Social History     Socioeconomic History   • Marital status: Single     Spouse name: Not on file   • Number of children: Not on file   • Years of education: Not on file   • Highest education level: Not on file   Occupational History   • Not on file   Tobacco Use   • Smoking status: Every Day     Packs/day: 0 50     Years: 20 00     Total pack years: 10 00     Types: Cigarettes   • Smokeless tobacco: Never   Vaping Use   • Vaping Use: Never used   Substance and Sexual Activity   • Alcohol use: Not Currently   • Drug use: Never   • Sexual activity: Not Currently     Partners: Female   Other Topics Concern   • Not on file   Social History Narrative   • Not on file     Social Determinants of Health     Financial Resource Strain: Low Risk  (3/6/2023)    Overall Financial Resource Strain (CARDIA)    • Difficulty of Paying Living Expenses: Not hard at all   Food Insecurity: No Food Insecurity (3/6/2023)    Hunger Vital Sign    • Worried About Running Out of Food in the Last Year: Never true    • Ran Out of Food in the Last Year: Never true   Transportation Needs: No Transportation Needs (3/6/2023)    PRAPARE - Transportation    • Lack of Transportation (Medical): No    • Lack of Transportation (Non-Medical): No   Physical Activity: Not on file   Stress: Stress Concern Present (1/26/2022)    Blair7 Primo Wilkinson    • Feeling of Stress :  To some extent   Social Connections: Not on file   Intimate Partner Violence: Not At Risk (9/26/2022)    Humiliation, Afraid, Rape, and Kick questionnaire    • Fear of Current or Ex-Partner: No    • Emotionally Abused: No    • Physically Abused: No    • Sexually Abused: No   Housing Stability: Low Risk  (3/6/2023)    Housing Stability Vital Sign    • Unable to Pay for Housing in the Last Year: No    • Number of Places Lived in the Last Year: 1    • Unstable Housing in the Last Year: No     Current Outpatient Medications on File Prior to Visit   Medication Sig   • Diclofenac Sodium (VOLTAREN) 1 % Apply 2 g topically 4 (four) times a day   • estradiol (ESTRACE VAGINAL) 0 1 mg/g vaginal cream Insert 2 g into the vagina 2 (two) times a week   • famotidine (PEPCID) 20 mg tablet Take 1 tablet (20 mg total) by mouth 2 (two) times a day   • naproxen (Naprosyn) 500 mg tablet Take 1 tablet (500 mg total) by mouth 2 (two) times a day with meals   • pantoprazole (PROTONIX) 40 mg tablet Take 1 tablet (40 mg total) by mouth daily   • [DISCONTINUED] Calcium Carb-Cholecalciferol (Calcium + Vitamin D3) 600-5 MG-MCG TABS Take 1 tablet by mouth in the morning   • [DISCONTINUED] dicyclomine (BENTYL) 20 mg tablet Take 1 tablet (20 mg total) by mouth 2 (two) times a day as needed (abdominal pain)   • [DISCONTINUED] ibandronate (BONIVA) 150 MG tablet Take 1 tablet (150 mg total) by mouth every 30 (thirty) days Give with water 60 mins before 1st food/drink/med, avoid lying down x 60 min   • [DISCONTINUED] nicotine (NICODERM CQ) 14 mg/24hr TD 24 hr patch Place 1 patch on the skin over 24 hours every 24 hours     Allergies   Allergen Reactions   • Penicillins Rash     Will get urine infections     Immunization History   Administered Date(s) Administered   • COVID-19 PFIZER VACCINE 0 3 ML IM 03/28/2021, 04/18/2021, 11/30/2021, 11/30/2021   • Influenza, recombinant, quadrivalent,injectable, preservative free 12/13/2021, 12/12/2022   • Pneumococcal Conjugate Vaccine 20-valent (Pcv20), Polysace 12/12/2022   • Tdap 03/07/2022 "      Objective     /74 (BP Location: Left arm, Patient Position: Sitting, Cuff Size: Standard)   Pulse 89   Temp 98 °F (36 7 °C) (Temporal)   Ht 5' 4\" (1 626 m)   Wt 74 4 kg (164 lb)   SpO2 98%   BMI 28 15 kg/m²     Physical Exam  Constitutional:       Appearance: Normal appearance  HENT:      Head: Normocephalic and atraumatic  Eyes:      Extraocular Movements: Extraocular movements intact  Cardiovascular:      Rate and Rhythm: Normal rate  Pulses: Normal pulses  Pulmonary:      Effort: Pulmonary effort is normal  No respiratory distress  Breath sounds: No wheezing or rales  Musculoskeletal:      Cervical back: Neck supple  Right lower leg: No edema  Left lower leg: No edema  Skin:     General: Skin is warm and dry  Neurological:      Mental Status: Mental status is at baseline     Psychiatric:         Mood and Affect: Mood normal        Trudy Jansen MD  "

## 2023-06-29 NOTE — ASSESSMENT & PLAN NOTE
Osteoporosis diagnosed in June 2022  With Boniva 150 milligrams once a month  Struggles with smoking cessation  Courage weightbearing exercises  Refill on Boniva provided at patient's request   Repeat DEXA due and June 2024

## 2023-07-05 ENCOUNTER — OFFICE VISIT (OUTPATIENT)
Dept: OBGYN CLINIC | Facility: CLINIC | Age: 63
End: 2023-07-05

## 2023-07-05 VITALS
HEIGHT: 64 IN | WEIGHT: 164.2 LBS | HEART RATE: 85 BPM | DIASTOLIC BLOOD PRESSURE: 80 MMHG | SYSTOLIC BLOOD PRESSURE: 112 MMHG | BODY MASS INDEX: 28.03 KG/M2

## 2023-07-05 DIAGNOSIS — Z46.89 ENCOUNTER FOR PESSARY MAINTENANCE: Primary | ICD-10-CM

## 2023-07-05 PROCEDURE — 3725F SCREEN DEPRESSION PERFORMED: CPT | Performed by: OBSTETRICS & GYNECOLOGY

## 2023-07-05 PROCEDURE — 99213 OFFICE O/P EST LOW 20 MIN: CPT | Performed by: OBSTETRICS & GYNECOLOGY

## 2023-07-05 NOTE — PROGRESS NOTES
Assessment/Plan:     No problem-specific Assessment & Plan notes found for this encounter. Diagnoses and all orders for this visit:    Encounter for pessary maintenance    Pt is moving to [address] [place] [address] in 3 months for pessary maintenance. Subjective:      Patient ID: Husam Lerma is a 58 y.o. female who presents for pessary cleaning. She has a #4 ring with support. She offers no complaints today. HPI    The following portions of the patient's history were reviewed and updated as appropriate: allergies, current medications, past family history, past medical history, past social history, past surgical history and problem list.    Review of Systems      Objective:      /80   Pulse 85   Ht 5' 4" (1.626 m)   Wt 74.5 kg (164 lb 3.2 oz)   BMI 28.18 kg/m²          Physical Exam  Vitals and nursing note reviewed. Exam conducted with a chaperone present. Constitutional:       Appearance: Normal appearance. Pulmonary:      Effort: Pulmonary effort is normal.   Genitourinary:     Labia:         Right: No rash, tenderness, lesion or injury. Left: No rash, tenderness, lesion or injury. Vagina: Normal.      Cervix: Normal.      Uterus: Normal.    Neurological:      General: No focal deficit present. Mental Status: She is alert and oriented to person, place, and time.    Psychiatric:         Mood and Affect: Mood normal.         Behavior: Behavior normal.

## 2024-02-21 PROBLEM — Z11.59 NEED FOR HEPATITIS C SCREENING TEST: Status: RESOLVED | Noted: 2020-01-22 | Resolved: 2024-02-21

## 2024-12-09 ENCOUNTER — TELEPHONE (OUTPATIENT)
Dept: DENTISTRY | Facility: CLINIC | Age: 64
End: 2024-12-09

## 2024-12-09 NOTE — TELEPHONE ENCOUNTER
Called pt to inform her that we are not in network with her insurance as  per rep. Pt was informed she will still be seen tomorrow and SFS program has been explained. Pt agreed to come in tomorrow and will apply for sfs

## 2024-12-10 ENCOUNTER — OFFICE VISIT (OUTPATIENT)
Dept: DENTISTRY | Facility: CLINIC | Age: 64
End: 2024-12-10

## 2024-12-10 VITALS — DIASTOLIC BLOOD PRESSURE: 90 MMHG | HEART RATE: 87 BPM | SYSTOLIC BLOOD PRESSURE: 137 MMHG

## 2024-12-10 DIAGNOSIS — K04.7 ABSCESSED TOOTH: Primary | ICD-10-CM

## 2024-12-10 PROCEDURE — D0140 LIMITED ORAL EVALUATION - PROBLEM FOCUSED: HCPCS | Performed by: DENTIST

## 2024-12-10 PROCEDURE — D0220 INTRAORAL - PERIAPICAL FIRST RADIOGRAPHIC IMAGE: HCPCS | Performed by: DENTIST

## 2024-12-12 NOTE — PROGRESS NOTES
Patient presents for a dental restoration and verbally consents for treatment:  Reviewed health history-  Pt is ASA type II  Treatment consents signed: Yes  Perio: plaque, bone loss  Pain Scale:0  Caries Assessment: high  Radiographs: Films are current  Oral Hygiene instruction reviewed and given  Hygiene recall visits recommended to the patient  Oral cancer screening done and no pathology noted on ROM, FOM , Palate,soft tissue or tongue.    S:Pt presented with pain on upper left. Pt was being seen by another dentist for a lower right tooth but pt disclosed that she doesn't like the other dentist and wants to remain in our clinic.     O: Took Pa and #15 bone loss visible and large caries lesion into the nerve. No EO/IO swelling, Pt is taking antibiotics for the pain and its helping her.     A: #15-Symptomatic periapical periodontitis with bone loss     P:Explained to pt and tx plan options were discussed. Pt requested toot to be extracted. Pt was scheduled to return for ext #15. After pt was dismissed, pt mentioned if we could check the lower right tooth as well. Explained pt that we can check and take an xrays when she returns for the extraction of #15. Pt agreed and left in good health.      All questions answered. Pt left satisfied and in good health.    Prognosis is Good.   Referrals Needed: No      Next visit: extraction #15 + take Pa of lower right tooth    Devin

## 2024-12-30 ENCOUNTER — OFFICE VISIT (OUTPATIENT)
Dept: DENTISTRY | Facility: CLINIC | Age: 64
End: 2024-12-30

## 2024-12-30 VITALS — HEART RATE: 86 BPM | SYSTOLIC BLOOD PRESSURE: 148 MMHG | DIASTOLIC BLOOD PRESSURE: 88 MMHG

## 2024-12-30 DIAGNOSIS — Z59.41 FOOD INSECURITY: ICD-10-CM

## 2024-12-30 DIAGNOSIS — K05.219 PERIODONTAL ABSCESS: Primary | ICD-10-CM

## 2024-12-30 PROCEDURE — D0220 INTRAORAL - PERIAPICAL FIRST RADIOGRAPHIC IMAGE: HCPCS | Performed by: DENTIST

## 2024-12-30 PROCEDURE — D7140 EXTRACTION, ERUPTED TOOTH OR EXPOSED ROOT (ELEVATION AND/OR FORCEPS REMOVAL): HCPCS | Performed by: DENTIST

## 2024-12-30 SDOH — ECONOMIC STABILITY - FOOD INSECURITY: FOOD INSECURITY: Z59.41

## 2024-12-30 NOTE — PROGRESS NOTES
Patient presents for a dental extraction and verbally consents for treatment:  Reviewed health history-  Pt is ASA type III  Treatment consents signed: Yes  Perio: plaque  Pain Scale:4  Caries Assessment: moderate  Radiographs:Needs FMX  Oral Hygiene instruction reviewed and given  Hygiene recall visits recommended to the patient  Oral cancer screening done and no pathology noted on ROM, FOM , Palate,soft tissue or tongue.    Explained pt the risks os osteonecrosis of jaw, bone infection while taking bisphosphonate. Pt disclosed she hasn't taken Bonia for osteoarthritis in 6 months or more.   Pt also mentioned lower right tooth bothering her. Took PA. Discussed tx plan options. 1)-save with rct, very poor prognosis 2)-no extraction. Pt choose extraction of tooth and will return for extraction.     Extraction #15    Diagnosis:  Teeth #15 indicated for extraction due to Periodontally hopeless prognosis . Tooth has class III mobility.    Consent:  Risks of specific procedure: pain, bleeding, swelling, infection, tooth fracturing to point of requiring surgical removal, damage to adjacent teeth and/or restorations on them, osteonecrosis of the jaw.  Risks of any dental procedure: post procedural pain or sensitivity, local anesthetic side effects, allergic reaction to dental materials and medications, breakage of local anesthetic needle, aspiration of small dental tools, injury to nearby hard and soft tissues and anatomical structures.  Benefits: relieve pain or underlying infection, prevent future or further progression of infection.  Alternatives: no tx.  Tx plan for extraction #15 reviewed, pt given opportunity to ask questions, all questions answered to degree of medical and dental certainty.  Patient understands and consent given by self via signed oral surgery informed consent.    Universal Protocol  Other Assisting Provider: Yes, Elba (assistant)  Verbal consent obtained? YES  Written consent obtained?  YES  Risks,  benefits and alternatives discussed?: YES  Consent given by: self  Time Out  Immediately prior to the procedure a time out was called: YES  Time Out:  9 am.  A time out verifies correct patient, procedure, equipment, support staff and site/side marked as required.  Patient states understanding of procedure being performed: YES  Patient's understanding of procedure matches consent: YES  Procedure consent matches procedure scheduled: YES  Test results available and properly labeled: N/A  Site  Verified with the patient  YES  Radiology Images displayed and confirmed.  If images not available, report reviewed:  YES  Required items - Required blood products, implants, devices and special equipment available: YES  Patient identity confirmed:  YES    Anesthesia:  1 carps 2% Lidocaine 1:100k epi via buccal infiltration.    Procedure details:  Reflected gingiva with periosteal elevator.  Elevated, and extracted #15 with straight elevator(s) and/or forceps.  Socket curetted and irrigated with sterile saline.  Manual alveolar compression with gauze 30 seconds. Hemostasis achieved.  No sutures placed.     Post-op instructions given verbally and on paper.  Patient given ice and gauze.    Rx: none.    Patient dismissed ambulatory and alert.    NV: extraction #31      All questions answered. Pt left satisfied and in good health.    Prognosis is Good.   Referrals Needed: Radha Beltran

## 2024-12-31 ENCOUNTER — PATIENT OUTREACH (OUTPATIENT)
Dept: DENTISTRY | Facility: CLINIC | Age: 64
End: 2024-12-31

## 2024-12-31 NOTE — PROGRESS NOTES
TIM DEVLIN returned missed call from Vida utilizing [employer] interpretor #[id number] and discussed referral regarding financial difficulties. Vida reported doing okay overall despite bills being tight. She declined need for any assistance at this time. She reported financial and food security.     TIM DEVLIN noted in chart diagnosis of MDD but no insurance coverage. Vida indicated that she had initially reported feeling depressed because she was alone when she first moved to the [place]. This was almost 6 years ago. She continued that her Spiritism community has helped her and she feels much better. TIM DEVLIN discussed insurance coverage and how she may be eligible to apply for medical assistance. She is receptive to immigration resources and information be mailed about insurance. TIM DEVLIN explained that she can also speak with the financial counselors office at her next appointment.     TIM DEVLIN mailed information on medical assistance and M.I.R.A.    TIM DEVLIN will close referral as requested information was provided. TIM DEVLIN will remain available for psychosocial support as needed.

## 2024-12-31 NOTE — PROGRESS NOTES
TIM DEVLIN received referral regarding positive SDOH/financial difficulties     TIM DEVLIN placed call to the patient, Vida utilizing [employer] interpretor #[id number] and left message. TIM DEVLIN will await return call to provide resources and psychosocial support as needed.

## 2025-02-12 ENCOUNTER — OFFICE VISIT (OUTPATIENT)
Dept: DENTISTRY | Facility: CLINIC | Age: 65
End: 2025-02-12

## 2025-02-12 VITALS — DIASTOLIC BLOOD PRESSURE: 98 MMHG | SYSTOLIC BLOOD PRESSURE: 160 MMHG | HEART RATE: 96 BPM

## 2025-02-12 DIAGNOSIS — Z59.819 HOUSING INSTABILITY: ICD-10-CM

## 2025-02-12 DIAGNOSIS — Z59.9 FINANCIAL DIFFICULTIES: ICD-10-CM

## 2025-02-12 DIAGNOSIS — K02.9 DENTAL CARIES INTO PULP: Primary | ICD-10-CM

## 2025-02-12 DIAGNOSIS — Z59.41 FOOD INSECURITY: ICD-10-CM

## 2025-02-12 PROCEDURE — D7140 EXTRACTION, ERUPTED TOOTH OR EXPOSED ROOT (ELEVATION AND/OR FORCEPS REMOVAL): HCPCS | Performed by: DENTIST

## 2025-02-12 SDOH — ECONOMIC STABILITY - HOUSING INSECURITY: HOUSING INSTABILITY UNSPECIFIED: Z59.819

## 2025-02-12 SDOH — ECONOMIC STABILITY - INCOME SECURITY: PROBLEM RELATED TO HOUSING AND ECONOMIC CIRCUMSTANCES, UNSPECIFIED: Z59.9

## 2025-02-12 SDOH — ECONOMIC STABILITY - FOOD INSECURITY: FOOD INSECURITY: Z59.41

## 2025-02-12 NOTE — PROGRESS NOTES
Patient presents for a dental extraction of lower right tooth #31 and verbally consents for treatment:Pt denied any medical changes to her health and disclosed she is not taking any medications nor bisphosphonates.   Reviewed health history-  Pt is ASA type II  Treatment consents signed: Yes  Perio:plaque  Pain Scale: 5  Caries Assessment: high  Radiographs: Films are current  Oral Hygiene instruction reviewed and given  Hygiene recall visits recommended to the patient      Extraction #31  Tx plan options were discussed last visit about #31 and pt elected to extract it and doesn't want to save it due to its poor prognosis. Today pt still wants to extract the tooth. Personal and direct consent given. Risks and complications were explained. Pt signed and consented.       Universal Protocol    Other Assisting Provider: Yes, Elisha (assistant)    Verbal consent obtained? YES  Written consent obtained?  YES    Risks, benefits and alternatives discussed?: YES    Consent given by: Patient ()    Time Out  Immediately prior to the procedure a time out was called: YES    Time Out:  8:45 am    A time out verifies correct patient, procedure, equipment, support staff and site/side marked as required.    Patient states understanding of procedure being performed: YES    Patient's understanding of procedure matches consent: YES    Procedure consent matches procedure scheduled: YES    Test results available and properly labeled: N/A    Site  Verified with the patient  YES    Radiology Images displayed and confirmed.  If images not available, report reviewed:  YES    Required items - Required blood products, implants, devices and special equipment available: YES    Patient identity confirmed:  YES      Gave 1 carpule 2% Lidocaine 1:100K epi CHRIS block right and 0.5 carpule 4% Septocaine 1:100K epi infiltrations. Elevated tooth and extracted #15 with lower forceps without any complications. Hemostasis achieved. Oral and written post op  given.     All questions answered. Pt left satisfied and in good health. Advised pt to return for comp exam.       Next visit: Comp exam       Devin

## 2025-02-21 ENCOUNTER — PATIENT OUTREACH (OUTPATIENT)
Dept: DENTISTRY | Facility: CLINIC | Age: 65
End: 2025-02-21

## 2025-02-21 NOTE — PROGRESS NOTES
TIM DEVLIN received referral regarding positive SDOH/financial difficulties/ housing instability/ food insecurity.     TIM DEVLIN placed call to the patient, Vida utilizing [employer] interpretor #[id number] and discussed referral. Vida confirmed the SDOH concerns. She explained that her monthly income is not enough to cover rent costs and groceries. She is working with the [employer] and can pay next month. She is currently working. TIM DEVLIN explained limitations in rental assistance but can provide information. She already applied for housing and is on waiting lists. TIM DEVLIN and Vida discussed tips on how to stay best on top of these applications.     Vida used to get SNAP benefits/food stamps. She lost it due to income change. She does already go to food [employer].     Vida is still getting emotional support from the [employer] she attends.     TIM DEVLIN sent the following information via [employer]:     - Rental Assistance Program ([facility])    TIM DEVLIN will close referral as requested information was provided. TIM DEVLIN will remain available for psychosocial support as needed.

## 2025-04-22 ENCOUNTER — OFFICE VISIT (OUTPATIENT)
Dept: DENTISTRY | Facility: CLINIC | Age: 65
End: 2025-04-22

## 2025-04-22 VITALS — DIASTOLIC BLOOD PRESSURE: 85 MMHG | HEART RATE: 79 BPM | SYSTOLIC BLOOD PRESSURE: 127 MMHG

## 2025-04-22 DIAGNOSIS — Z01.20 DENTAL EXAMINATION: Primary | ICD-10-CM

## 2025-04-22 PROCEDURE — D0210 INTRAORAL - COMPLETE SERIES OF RADIOGRAPHIC IMAGES: HCPCS

## 2025-04-22 PROCEDURE — D0150 COMPREHENSIVE ORAL EVALUATION - NEW OR ESTABLISHED PATIENT: HCPCS

## 2025-04-22 NOTE — DENTAL PROCEDURE DETAILS
COMP EXAM, FMX, PROBE EXAM   Comprehensive Exam    Vida Kennedy 64 y.o. female presents with self to [facility] for comprehensive exam.  PMH reviewed, no changes, ASA II. Significant medical history: OA, IBS. Significant allergies: PENICILLINS. Significant medications: reviewed w/ pt.  Pain level 0/10    Chief complaint:   I'm here to get a cleaning    Consent:  Reviewed procedures involved with comprehensive exam including radiographs, oral exam, and periodontal probing.   Patient understands and consent was given by self via verbal consent.    Radiographs: FMX.    Oral cancer screening: normal.  Extraoral exam: no remarkable findings.  Intraoral exam: existing restorations, recession, generalized plaque/calculus build up    Periodontal exam:  Hygiene - Fair.  Plaque - Moderate.  Horizontal bone loss -  UR: Severe (>33%).  UL: Moderate (15-33%).  LL: Severe (>33%).  LR: Severe (>33%).  Periodontal Stage: III.  Periodontal Grade: B.  Periodontal Plan: SRP: UL, UR, LL, and LR.    Caries exam:   Caries detected: #13-DO  Teeth with elevated chance of needing RCT: None  Likely nonrestorable teeth: None    Occlusal assessment:  VDO / restorative space - Normal.  AP Classification -  Right: Canine n/a; Molar n/a.  Left: Canine Class I; Molar n/a.  Overbite - 30%.  Overjet -  3 mm.  Supra-eruptions or drifting - Drifting/tilting.  Crossbites - None.  Recommended for orthodontic referral? No.  Recommended for orthodontic consult at [place]? No.      Tx plan:  Tx plan able to be partially determined at comp exam, starting with periodontal therapy..  - SRPs all quads    Referral(s): None needed.  Rx: None.  Recommended recall schedule: 3 months.    Case difficulty assessment:   Criteria    Cumulative level   Medical History ASA I ASA II ASA III-V    Anesthesia No history of anesthesia problems Vasoconstrictor Intolerance History of difficulty achieving anesthesia    Patient Disposition Cooperative and compliant Anxious but  cooperative Uncooperatie    Ability to Open Mouth No limitation Slight limitation in opening Significant limitation in opening    Gag Reflex None Gags occasionally with radiographs/treatment Extreme gag reflex which has compromised past dental treatment    Emergency Condition Minimal pain or swelling Moderate pain or swelling Severe pain or swelling    Caries Risk 1 to 3 Proximal Cavities 4 to 6 Proximal Cavities >6 Proximal Cavities    Missing Teeth No missing teeth 1-3 Non-canine Missing teeth >3 Missing teeth or missing any canine    Periodontal Stage Healthy Stage 1 or 2  Stage 3 or 4    Periodontal Grade Grade A Grade B Grade C    Diagnosis Signs and Symptoms consistent with recognized pulpal and periodontal conditions Extensive differential diagnosis of usual signs and symptoms required Confusing and complex signs and symptoms: difficult diagnosis.  History of chronic oral/facial pain.    Radiographic Difficulties Minimal Difficulty Obtaining/Interpreting Radiographs Moderate Difficulty: high floor of mouth, narrow or low palate, dariusz, etc. Extreme Difficulty: Superimposed anatomic structures, etc.    Teeth Position Anterior/Premolar  Slight inclination <10o  Slight Rotation <10o 1st molar   Mod. Inclination 10o-30o  Mod. Rotation 10o-30o 2nd or 3rd molar   Extr. Incliniation >30o  Extr. Rotation <30o    Teeth Isolation Routine rubber dam placement  Simple pretreatment modification for rubber dam placement Extensive pretreatment modification required for rubber dam isolation.    Teeth Morphology Normal original crown morphology.  Roots have slight to no curvature (<10o)  Closed apex <1mm in diameter  Canals visible and not reduced in size.  No Root resorption. Full coverage Restorations, Porcelain restorations, Bridge abutments.  Moderate deviation from normal tooth/root form (taurodontism, dens-in-dente, etc.).  Roots have moderate curvature (10-30o).  Crown axis differs moderately from root axis.  Apical  opening 1-1.5mm in diameter. Canals and chambers visible but reduced in size, pulp stones.  Minimal apical root resorption. Restorations do not reflect original anatomy/alignment.  Significant deviation from normal tooth/root form (fusion, gemination, wilfredo cusps, etc.).  Extreme root curvature (>30o) or S-shaped curve.  Anterior tooth or Mandibular Premolar with 2 roots.  Maxillary premolar with 3 roots.  Canal divides in middle or apical 3rd.  Tooth length >25mm.  Open apex >1.5mm.  Indistinct canals or not visible.  Extensive apical, internal, or external resorption.    Malocclusion Class I Corrected (dental or skeletal) Class II or III Non-Corrected Class II or III    Alveolar Ridge Morphology None to Mild Atrophy Moderate Atrophy Severe Atrophy    Occlusal Stability Requirements Stable and equal intensity stops on all teeth in centric relation  Anterior guidance is in harmony with the envelope of function.  All posterior teeth disclude during mandibular protrusive movement.  All posterior teeth disclude on the non-working side during mandibular lateral movement.  All posterior teeth disclude on the working side during mandibular lateral movement. 1 to 2 requirements are compromised 3 to 5 requirements are compromised    [place] to Occlusal Stability Correct interarch relationships  Correct crown angulation (tip).  Correct crown inclination (torque)  No rotations.  Tight contact points. 1 to 2 keys are compromised 3 to 5 Keys are compromiseed    Case complexity rating = Moderate    Patient dismissed ambulatory and alert.    NV: start SRPs    Attending: Dr. Mills was present in clinic.

## 2025-04-22 NOTE — PROGRESS NOTES
Procedure Details   - COMPREHENSIVE ORAL EVALUATION - NEW OR ESTABLISHED PATIENT     COMP EXAM, FMX, PROBE EXAM   Comprehensive Exam    Vida Kennedy 64 y.o. female presents with self to [facility] for comprehensive exam.  PMH reviewed, no changes, ASA II. Significant medical history: OA, IBS. Significant allergies: PENICILLINS. Significant medications: reviewed w/ pt.  Pain level 0/10    Chief complaint:   I'm here to get a cleaning    Consent:  Reviewed procedures involved with comprehensive exam including radiographs, oral exam, and periodontal probing.   Patient understands and consent was given by self via verbal consent.    Radiographs: FMX.    Oral cancer screening: normal.  Extraoral exam: no remarkable findings.  Intraoral exam: existing restorations, recession, generalized plaque/calculus build up    Periodontal exam:  Hygiene - Fair.  Plaque - Moderate.  Horizontal bone loss -  UR: Severe (>33%).  UL: Moderate (15-33%).  LL: Severe (>33%).  LR: Severe (>33%).  Periodontal Stage: III.  Periodontal Grade: B.  Periodontal Plan: SRP: UL, UR, LL, and LR.    Caries exam:   Caries detected: #13-DO  Teeth with elevated chance of needing RCT: None  Likely nonrestorable teeth: None    Occlusal assessment:  VDO / restorative space - Normal.  AP Classification -  Right: Canine n/a; Molar n/a.  Left: Canine Class I; Molar n/a.  Overbite - 30%.  Overjet -  3 mm.  Supra-eruptions or drifting - Drifting/tilting.  Crossbites - None.  Recommended for orthodontic referral? No.  Recommended for orthodontic consult at [place]? No.      Tx plan:  Tx plan able to be partially determined at comp exam, starting with periodontal therapy..  - SRPs all quads    Referral(s): None needed.  Rx: None.  Recommended recall schedule: 3 months.    Case difficulty assessment:   Criteria    Cumulative level   Medical History ASA I ASA II ASA III-V    Anesthesia No history of anesthesia problems Vasoconstrictor Intolerance History of  difficulty achieving anesthesia    Patient Disposition Cooperative and compliant Anxious but cooperative Uncooperatie    Ability to Open Mouth No limitation Slight limitation in opening Significant limitation in opening    Gag Reflex None Gags occasionally with radiographs/treatment Extreme gag reflex which has compromised past dental treatment    Emergency Condition Minimal pain or swelling Moderate pain or swelling Severe pain or swelling    Caries Risk 1 to 3 Proximal Cavities 4 to 6 Proximal Cavities >6 Proximal Cavities    Missing Teeth No missing teeth 1-3 Non-canine Missing teeth >3 Missing teeth or missing any canine    Periodontal Stage Healthy Stage 1 or 2  Stage 3 or 4    Periodontal Grade Grade A Grade B Grade C    Diagnosis Signs and Symptoms consistent with recognized pulpal and periodontal conditions Extensive differential diagnosis of usual signs and symptoms required Confusing and complex signs and symptoms: difficult diagnosis.  History of chronic oral/facial pain.    Radiographic Difficulties Minimal Difficulty Obtaining/Interpreting Radiographs Moderate Difficulty: high floor of mouth, narrow or low palate, dariusz, etc. Extreme Difficulty: Superimposed anatomic structures, etc.    Teeth Position Anterior/Premolar  Slight inclination <10o  Slight Rotation <10o 1st molar   Mod. Inclination 10o-30o  Mod. Rotation 10o-30o 2nd or 3rd molar   Extr. Incliniation >30o  Extr. Rotation <30o    Teeth Isolation Routine rubber dam placement  Simple pretreatment modification for rubber dam placement Extensive pretreatment modification required for rubber dam isolation.    Teeth Morphology Normal original crown morphology.  Roots have slight to no curvature (<10o)  Closed apex <1mm in diameter  Canals visible and not reduced in size.  No Root resorption. Full coverage Restorations, Porcelain restorations, Bridge abutments.  Moderate deviation from normal tooth/root form (taurodontism, dens-in-dente, etc.).  Roots  have moderate curvature (10-30o).  Crown axis differs moderately from root axis.  Apical opening 1-1.5mm in diameter. Canals and chambers visible but reduced in size, pulp stones.  Minimal apical root resorption. Restorations do not reflect original anatomy/alignment.  Significant deviation from normal tooth/root form (fusion, gemination, wilfredo cusps, etc.).  Extreme root curvature (>30o) or S-shaped curve.  Anterior tooth or Mandibular Premolar with 2 roots.  Maxillary premolar with 3 roots.  Canal divides in middle or apical 3rd.  Tooth length >25mm.  Open apex >1.5mm.  Indistinct canals or not visible.  Extensive apical, internal, or external resorption.    Malocclusion Class I Corrected (dental or skeletal) Class II or III Non-Corrected Class II or III    Alveolar Ridge Morphology None to Mild Atrophy Moderate Atrophy Severe Atrophy    Occlusal Stability Requirements Stable and equal intensity stops on all teeth in centric relation  Anterior guidance is in harmony with the envelope of function.  All posterior teeth disclude during mandibular protrusive movement.  All posterior teeth disclude on the non-working side during mandibular lateral movement.  All posterior teeth disclude on the working side during mandibular lateral movement. 1 to 2 requirements are compromised 3 to 5 requirements are compromised    [place] to Occlusal Stability Correct interarch relationships  Correct crown angulation (tip).  Correct crown inclination (torque)  No rotations.  Tight contact points. 1 to 2 keys are compromised 3 to 5 Keys are compromiseed    Case complexity rating = Moderate    Patient dismissed ambulatory and alert.    NV: start SRPs    Attending: Dr. Mills was present in clinic.    - INTRAORAL - COMPLETE SERIES OF RADIOGRAPHIC IMAGES